# Patient Record
Sex: FEMALE
[De-identification: names, ages, dates, MRNs, and addresses within clinical notes are randomized per-mention and may not be internally consistent; named-entity substitution may affect disease eponyms.]

---

## 2018-09-20 NOTE — DIAGNOSTIC IMAGING REPORT
PROCEDURE:X-RAY ABDOMEN - KUB

 

COMPARISON:None.

 

INDICATIONS:CONSTIPATION

 

FINDINGS:

 

The left hemidiaphragm is elevated. There are no dilated loops of bowel 

to suggest obstruction. Moderate distal colonic and rectal feces is 

present. There are no masses or abnormal calcifications.  Clips in the 

right upper quadrant of the abdomen. There is no evidence of free air. 

No acute osseous abnormalities are present.

 

 

CONCLUSION:

1. No acute abdominal abnormality. 

2. Moderate amount of rectal and distal colonic fecal material.

 

 

 

 

Jeremie Aguilera D.O.  

Dictated by:  Jeremie Aguilera D.O. on 9/20/2018 at 10:55     

Electronically approved by:  Jeremie Aguilera D.O. on 9/20/2018 at 10:55

## 2018-11-14 NOTE — DIAGNOSTIC IMAGING REPORT
PROCEDURE:  X-RAY UPPER GI SERIES WITH AIR CONTRAST

 

TECHNIQUE: Effervescent crystals and barium were ingested by mouth and 

multiple fluoroscopic images of the esophagus, stomach, and proximal 

small bowel were obtained.

 

Fluoroscopy time: 3 minutes

Air Kerma:  43.2 mGy

   

 

COMPARISON: Abdominal radiograph 9/20/2018.

 

INDICATIONS: Belching, heartburn

 

FINDINGS: 

 

ESOPHAGUS:  

  Motility: Multiple tertiary, nonperistaltic contractions.

  Mucosa: The esophageal mucosa is unremarkable. However, there is mass 

effect on the right postero-lateral margin of the distal esophagus.

  Distensibility: Normal.

 

GASTROESOPHAGEAL JUNCTION: No hiatal hernia is seen. However, there is 

left hemidiaphragmatic elevation with the majority of the gastric body 

within the left upper quadrant.

 

GASTROESOPHAGEAL REFLUX: Mild reflux into the lower third of the 

esophagus.

 

STOMACH: Normally distensible with normal contours. Mild thickening of 

the mucosal folds suggestive of gastritis.

 

DUODENUM:  Bulb and sweep are normal.  Duodenal-jejunal junction is in 

the normal expected position.

 

 

 

IMPRESSION: 

 

Mild gastroesophageal reflux.

 

Mild thickening of the gastric mucosal folds suggestive of gastritis.

 

Indentation/mass effect on the distal aspect of the esophagus without 

overlying mucosal irregularity. Differential diagnosis includes a 

submucosal esophageal lesion or mass effect from an adjacent middle or 

posterior mediastinal mass. CT scan of the chest with contrast is 

suggested for further evaluation.

   

 

Dictated by:  Erasmo Mcgarry M.D. on 11/14/2018 at 8:38     

Electronically approved by:  Erasmo Mcgarry M.D. on 11/14/2018 at 8:38

## 2018-12-08 NOTE — XMS REPORT
Patient Summary Document

                             Created on: 2018



RUBEN YEUNG

External Reference #: [id number]

: 1940

Sex: Female



Demographics







                          Address                   [address] [place] [address]

 

                          Home Phone                [phone]

 

                          Preferred Language        Unknown

 

                          Marital Status            Unknown

 

                          Evangelical Affiliation     Unknown

 

                          Race                      Unknown

 

                                        Additional Race(s)  

 

                          Ethnic Group              Unknown





Author







                          Author                    [facility]

 

                          [facility]

 

                          Address                   Unknown

 

                          Phone                     Unavailable







Care Team Providers







                    Care Team Member Name    Role                Phone

 

                    SVEN EDWARDS      Unavailable         Unavailable

 

                    SVEN THOMPSON    Unavailable         Unavailable







Problems

This patient has no known problems.



Allergies, Adverse Reactions, Alerts

This patient has no known allergies or adverse reactions.



Medications

This patient has no known medications.



Results







           Test Description    Test Time    Test Comments    Text Results    Atomic Results    Result

 Comments

 

                UPPER GI W/AIR CONTR    2018 08:38:00                                                      

                                                    [place] [address]      Patient Name: RUBEN YEUNG                               
   MR #: [id number]                     : 1940                         
         Age/Sex: 78/F  Acct #: [id number]                              Req #:
[id number]  Adm Physician:                                                      
Ordered by: SUJATA EDWARDS MD                            Report #: [id number]    
   Location: DX                                      Room/Bed:                  
  
___________________________________________________________________________________________________
   Procedure: [id number] DX/UPPER GI W/AIR CONTR  Exam Date:                     
       Exam Time:                                               REPORT STATUS: 
Signed    PROCEDURE:  X-RAY UPPER GI SERIES WITH AIR CONTRAST       TECHNIQUE: 
Effervescent crystals and barium were ingested by mouth and    multiple 
fluoroscopic images of the esophagus, stomach, and proximal    small bowel were 
obtained.       Fluoroscopy time: 3 minutes   Air Kerma:  43.2 mGy             
COMPARISON: Abdominal radiograph 2018.       INDICATIONS: Belching, 
heartburn       FINDINGS:        ESOPHAGUS:       Motility: Multiple tertiary, 
nonperistaltic contractions.     Mucosa: The esophageal mucosa is unremarkable. 
However, there is mass    effect on the right postero-lateral margin of the 
distal esophagus.     Distensibility: Normal.       GASTROESOPHAGEAL JUNCTION: 
No hiatal hernia is seen. However, there is    left hemidiaphragmatic elevation 
with the majority of the gastric body    within the left upper quadrant.       
GASTROESOPHAGEAL REFLUX: Mild reflux into the lower third of the    esophagus.  
    STOMACH: Normally distensible with normal contours. Mild thickening of    
the mucosal folds suggestive of gastritis.       DUODENUM:  Bulb and sweep are 
normal.  Duodenal-jejunal junction is in    the normal expected position.       
       IMPRESSION:        Mild gastroesophageal reflux.       Mild thickening of
the gastric mucosal folds suggestive of gastritis.       Indentation/mass effect
on the distal aspect of the esophagus without    overlying mucosal irregularity.
Differential diagnosis includes a    submucosal esophageal lesion or mass effect
from an adjacent middle or    posterior mediastinal mass. CT scan of the chest 
with contrast is    suggested for further evaluation.             Dictated by:  
Mary Hsu M.D. on 2018 at 8:38        Electronically approved by:  Mary Hsu M.D. on 2018 at 8:38                Dictated By: MARY HSU MD  
Electronically Signed By: MARY HSU MD on 18  Transcribed By: 
NAYLA on 18       COPY TO:   SUJATA EDWARDS MD             

 

                ABDOMEN-VIEW ([facility])    2018 10:55:00                        [place] [address]      Patient Name: 
RUBEN YEUNG   MR #: [id number]    : 1940 Age/Sex: 78/F  Acct #: 
[id number] Req #: [id number]  Adm Physician:     Ordered by: JIA THOMPSON MD  Report #: [id number]   Location: ER  Room/Bed:     
_________________________________________________________________________
__________________________    Procedure: [id number] DX/ABDOMEN-1VIEW (KUB)  Exam 
Date: 18                            Exam Time: 0935       REPORT STATUS: 
Signed    PROCEDURE:   X-RAY ABDOMEN - KUB       COMPARISON:   None.       
INDICATIONS:   CONSTIPATION       FINDINGS:          The left hemidiaphragm is 
elevated. There are no dilated loops of bowel    to suggest obstruction. 
Moderate distal colonic and rectal feces is    present. There are no masses or 
abnormal calcifications.  Clips in the    right upper quadrant of the abdomen. 
There is no evidence of free air.    No acute osseous abnormalities are present.
          CONCLUSION:      1. No acute abdominal abnormality.    2. Moderate 
amount of rectal and distal colonic fecal material.                   Blaze Aguilera D.O.     Dictated by:  Blaze Aguilera D.O. on 2018 at 10:55       
Electronically approved by:  Blaze Aguilera D.O. on 2018 at 10:55          
        Dictated By: BLAZE AGUILERA DO  Electronically Signed By: BLAZE AGUILERA DO on 18 1055  Transcribed By: NAYLA on 18 1055       COPY TO:   
JIA THOMPSON MD

## 2019-01-02 NOTE — DIAGNOSTIC IMAGING REPORT
******** ADDENDUM #1 ********



ADDENDUM:

Dose modulation, iterative reconstruction, and/or weight based adjustment of

the mA/kV was utilized to reduce the radiation dose to as low as reasonably

achievable. 



Signed by: Dr. Sanya Amin MD on 1/2/2019 5:24 PM

******** ORIGINAL REPORT ********



EXAM: CT Abdomen and Pelvis WITH contrast  



COMPARISON: None.

TECHNIQUE: Abdomen and pelvis were scanned utilizing a multidetector helical

scanner from the lung base to the pubic symphysis after administration of IV

contrast. Coronal and sagittal reformations were obtained. Routine protocol was

performed. Scan was performed when during portal venous phase.    

            

RADIATION DOSE:

     Total DLP:  361.7 mGy*cm

     CTDIvol has been reviewed. It is below the limits set by the Radiation

Protocol Committee (RPC).



FINDINGS:

LINES and TUBES: None.



LOWER THORAX:  Please refer to the concurrently performed chest CT for details

of intrathoracic findings.



HEPATOBILIARY: No evidence of focal lesion. No biliary ductal dilation. Status

post cholecystectomy. There is a 2.8 cm simple cyst in the caudate lobe.



SPLEEN: No splenomegaly. 



PANCREAS: No focal masses or ductal dilatation.  



ADRENALS: No adrenal nodules 



KIDNEYS/URETERS: Kidneys enhance symmetrically. No evidence of hydronephrosis,

solid mass, or stone. 



GI TRACT: The left hemidiaphragm is elevated, and consequently the body of the

stomach is in the left lower hemithorax with the fundus of the stomach folding

inferiorly. However there is no gastroesophageal hernia or evidence of

volvulus. No evidence of wall thickening or distension. Appendix is not clearly

identified. There is however no fat stranding or adenopathy in the right lower

quadrant to suggest appendicitis. There is extensive descending and sigmoid

colonic diverticulosis without CT evidence of diverticulitis. There is retained

contrast in some of the diverticula.



PELVIC ORGANS/BLADDER: Unremarkable.



LYMPH NODES: No lymphadenopathy.



VESSELS: Moderate atherosclerotic calcifications of the abdominal aorta and

branch vessels. 



PERITONEUM / RETROPERITONEUM: No free air or fluid.



BONES AND SOFT TISSUES: No acute bony findings. Degenerative changes of the

visualized spine.



CONCLUSION:

No evidence of acute pathology in the abdomen or pelvis.



Diverticulosis without CT evidence of diverticulitis. 



No evidence of hiatal hernia. The left hemidiaphragm is elevated, and

consequently the body of the stomach is in the left lower hemithorax with the

fundus of the stomach folding inferiorly. No evidence of volvulus. 



Signed by: Dr. Sanya Amin MD on 1/2/2019 4:37 PM

## 2019-01-02 NOTE — DIAGNOSTIC IMAGING REPORT
EXAM: CT Chest WITH contrast 



COMPARISON: None



TECHNIQUE:

Chest was scanned utilizing a multidetector helical scanner from the lung apex

through the level of the adrenal glands without administration of IV contrast.

Coronal and sagittal reformations were obtained. Routine protocol was

performed.



           RADIATION DOSE: Total DLP: 361.7 mGy*cm

           COMPLICATIONS: None



FINDINGS:

LINES/ TUBES: None.



LUNGS AND AIRWAYS: The central airways are patent. No evidence of consolidation

or pulmonary edema. 



PLEURA: The pleural spaces are clear.



HEART AND MEDIASTINUM: The thyroid gland is normal.  No mediastinal, hilar or

axillary lymphadenopathy. No cardiomegaly or pericardial effusion. Extensive

coronary atherosclerosis. Moderate atherosclerotic calcifications of the

thoracic aorta and branch vessels. Mild elevation of left hemidiaphragm. Mild

enlargement of the main pulmonary artery, measuring up to 3.1 cm.



UPPER ABDOMEN: Please refer to the concurrently performed abdominal CT for

further details. 



BONES/SOFT TISSUES: No acute bony findings. Partially seen cervical spine

fixation hardware.



IMPRESSION: 

No acute intrathoracic findings.



Extensive coronary atherosclerosis.



Mild enlargement of the main pulmonary artery, measuring up to 3.1 cm, which

may reflect pulmonary arterial hypertension.



Please refer to the concurrently performed abdominal CT for further details. 



Signed by: Dr. Sanya Amin MD on 1/2/2019 4:19 PM

## 2019-06-03 LAB
ANION GAP SERPL CALC-SCNC: 11.6 MMOL/L (ref 8–16)
BASOPHILS # BLD AUTO: 0 10*3/UL (ref 0–0.1)
BASOPHILS NFR BLD AUTO: 0.2 % (ref 0–1)
BUN SERPL-MCNC: 18 MG/DL (ref 7–26)
BUN/CREAT SERPL: 24 (ref 6–25)
CALCIUM SERPL-MCNC: 10.5 MG/DL (ref 8.4–10.2)
CHLORIDE SERPL-SCNC: 100 MMOL/L (ref 98–107)
CO2 SERPL-SCNC: 30 MMOL/L (ref 22–29)
DEPRECATED NEUTROPHILS # BLD AUTO: 5.1 10*3/UL (ref 2.1–6.9)
EGFRCR SERPLBLD CKD-EPI 2021: > 60 ML/MIN (ref 60–?)
EOSINOPHIL # BLD AUTO: 0 10*3/UL (ref 0–0.4)
EOSINOPHIL NFR BLD AUTO: 0.5 % (ref 0–6)
ERYTHROCYTE [DISTWIDTH] IN CORD BLOOD: 13.2 % (ref 11.7–14.4)
GLUCOSE SERPLBLD-MCNC: 102 MG/DL (ref 74–118)
HCT VFR BLD AUTO: 45.6 % (ref 34.2–44.1)
HGB BLD-MCNC: 15.1 G/DL (ref 12–16)
LYMPHOCYTES # BLD: 2.1 10*3/UL (ref 1–3.2)
LYMPHOCYTES NFR BLD AUTO: 25.8 % (ref 18–39.1)
MCH RBC QN AUTO: 30.6 PG (ref 28–32)
MCHC RBC AUTO-ENTMCNC: 33.1 G/DL (ref 31–35)
MCV RBC AUTO: 92.5 FL (ref 81–99)
MONOCYTES # BLD AUTO: 0.9 10*3/UL (ref 0.2–0.8)
MONOCYTES NFR BLD AUTO: 11.1 % (ref 4.4–11.3)
NEUTS SEG NFR BLD AUTO: 62.2 % (ref 38.7–80)
PLATELET # BLD AUTO: 219 X10E3/UL (ref 140–360)
POTASSIUM SERPL-SCNC: 3.6 MMOL/L (ref 3.5–5.1)
RBC # BLD AUTO: 4.93 X10E6/UL (ref 3.6–5.1)
SODIUM SERPL-SCNC: 138 MMOL/L (ref 136–145)

## 2019-06-03 NOTE — DIAGNOSTIC IMAGING REPORT
EXAMINATION:  CHEST 2 VIEWS    



INDICATION: Pre-admit.



COMPARISON:  CT chest with contrast 1/2/2019.

     

FINDINGS:

TUBES and LINES:  None.



LUNGS:  Lungs are well inflated.  There is no evidence of pneumonia or

pulmonary edema.



PLEURA:  No pleural effusion or pneumothorax. Mild elevation of left

hemidiaphragm, unchanged.



HEART AND MEDIASTINUM:  The cardiomediastinal silhouette is unremarkable. There

are atherosclerotic calcifications within the aorta.



BONES AND SOFT TISSUES:  No acute osseous abnormality.



UPPER ABDOMEN: No free air under the diaphragm.    



IMPRESSION: 

No acute radiographic abnormality.



Signed by: Dr. Sanya Amin MD on 6/3/2019 11:31 AM

## 2019-06-06 ENCOUNTER — HOSPITAL ENCOUNTER (OUTPATIENT)
Dept: HOSPITAL 88 - OR | Age: 79
Discharge: HOME | End: 2019-06-06
Attending: SURGERY
Payer: MEDICARE

## 2019-06-06 VITALS — SYSTOLIC BLOOD PRESSURE: 148 MMHG | DIASTOLIC BLOOD PRESSURE: 74 MMHG

## 2019-06-06 DIAGNOSIS — Z01.812: ICD-10-CM

## 2019-06-06 DIAGNOSIS — Z01.810: ICD-10-CM

## 2019-06-06 DIAGNOSIS — C50.911: Primary | ICD-10-CM

## 2019-06-06 DIAGNOSIS — I10: ICD-10-CM

## 2019-06-06 DIAGNOSIS — K21.9: ICD-10-CM

## 2019-06-06 DIAGNOSIS — Z01.811: ICD-10-CM

## 2019-06-06 PROCEDURE — 71046 X-RAY EXAM CHEST 2 VIEWS: CPT

## 2019-06-06 PROCEDURE — 93005 ELECTROCARDIOGRAM TRACING: CPT

## 2019-06-06 PROCEDURE — 19301 PARTIAL MASTECTOMY: CPT

## 2019-06-06 PROCEDURE — 36415 COLL VENOUS BLD VENIPUNCTURE: CPT

## 2019-06-06 PROCEDURE — 80048 BASIC METABOLIC PNL TOTAL CA: CPT

## 2019-06-06 PROCEDURE — 88307 TISSUE EXAM BY PATHOLOGIST: CPT

## 2019-06-06 PROCEDURE — 85025 COMPLETE CBC W/AUTO DIFF WBC: CPT

## 2019-06-06 NOTE — OPERATIVE REPORT
DATE OF PROCEDURE:  06/06/2019

 

SURGEON:  Bobby De Leon MD

 

PREOPERATIVE DIAGNOSIS:  Carcinoma of the right breast.

 

POSTOPERATIVE DIAGNOSIS:  Carcinoma of the right breast.

 

OPERATION PERFORMED:  Lumpectomy of the right breast.

 

ASSISTANT:  DONOVAN Aiken.

 

ANESTHESIA:  General.

 

COMPLICATIONS:  None.

 

ESTIMATED BLOOD LOSS:  Minimal.

 

PROCEDURE IN DETAIL:  With the patient lying in bed in the supine position under good

general anesthesia, the right breast and chest were prepped with Betadine solution and

draped in the usual manner.  The area overlying the mass in the 9 o'clock position of

the right breast was then infiltrated with 0.25% Marcaine with epinephrine.  An incision

was made from about 10 o'clock to about the 8 o'clock position and the mass was then

slowly and carefully resected with normal breast tissue all the way around, it was

totally and completely removed.  It was then properly oriented with sutures for

pathology orientation.  The whole area was then thoroughly irrigated, perfect hemostasis

was ascertained and the wound was then closed in layers.  The breast tissue was

reapproximated with interrupted sutures of 2-0 chromic and the skin was closed with

interrupted vertical mattress sutures of 3-0 nylon.  A dressing was applied.  The

sponge, lap, and needle count was correct.  The patient tolerated the procedure well and

returned to the recovery room in stable condition. 

 

 

 

 

______________________________

MD SERA LrR/MODL

D:  06/06/2019 10:54:49

T:  06/06/2019 17:55:50

Job #:  [id number]

## 2019-08-19 LAB
ANION GAP SERPL CALC-SCNC: 12.9 MMOL/L (ref 8–16)
BASOPHILS # BLD AUTO: 0 10*3/UL (ref 0–0.1)
BASOPHILS NFR BLD AUTO: 0.3 % (ref 0–1)
BUN SERPL-MCNC: 17 MG/DL (ref 7–26)
BUN/CREAT SERPL: 25 (ref 6–25)
CALCIUM SERPL-MCNC: 10.2 MG/DL (ref 8.4–10.2)
CHLORIDE SERPL-SCNC: 100 MMOL/L (ref 98–107)
CO2 SERPL-SCNC: 31 MMOL/L (ref 22–29)
DEPRECATED NEUTROPHILS # BLD AUTO: 3.1 10*3/UL (ref 2.1–6.9)
EGFRCR SERPLBLD CKD-EPI 2021: > 60 ML/MIN (ref 60–?)
EOSINOPHIL # BLD AUTO: 0.1 10*3/UL (ref 0–0.4)
EOSINOPHIL NFR BLD AUTO: 1.2 % (ref 0–6)
ERYTHROCYTE [DISTWIDTH] IN CORD BLOOD: 13.5 % (ref 11.7–14.4)
GLUCOSE SERPLBLD-MCNC: 95 MG/DL (ref 74–118)
HCT VFR BLD AUTO: 44 % (ref 34.2–44.1)
HGB BLD-MCNC: 14 G/DL (ref 12–16)
LYMPHOCYTES # BLD: 1.9 10*3/UL (ref 1–3.2)
LYMPHOCYTES NFR BLD AUTO: 33.6 % (ref 18–39.1)
MCH RBC QN AUTO: 29.7 PG (ref 28–32)
MCHC RBC AUTO-ENTMCNC: 31.8 G/DL (ref 31–35)
MCV RBC AUTO: 93.4 FL (ref 81–99)
MONOCYTES # BLD AUTO: 0.7 10*3/UL (ref 0.2–0.8)
MONOCYTES NFR BLD AUTO: 11.8 % (ref 4.4–11.3)
NEUTS SEG NFR BLD AUTO: 52.8 % (ref 38.7–80)
PLATELET # BLD AUTO: 201 X10E3/UL (ref 140–360)
POTASSIUM SERPL-SCNC: 3.9 MMOL/L (ref 3.5–5.1)
RBC # BLD AUTO: 4.71 X10E6/UL (ref 3.6–5.1)
SODIUM SERPL-SCNC: 140 MMOL/L (ref 136–145)

## 2019-08-19 NOTE — DIAGNOSTIC IMAGING REPORT
Chest, 2 views,  8/19/2019.   

 



History: Preop, mastectomy.



Comparison: 8/3/2019.



Findings: The cardiomediastinal silhouette and pulmonary vasculature are within

normal limits. The lungs are clear without evidence of consolidation or pleural

effusion. There is persistent elevation of the left hemidiaphragm. Surgical

hardware is partially seen within the lower cervical spine. There are no acute

osseous or soft tissue abnormalities. 



Impression: 

No acute cardiopulmonary abnormality.



Signed by: Minor Nieto on 8/19/2019 10:50 AM

## 2019-08-26 ENCOUNTER — HOSPITAL ENCOUNTER (OUTPATIENT)
Dept: HOSPITAL 88 - OR | Age: 79
Setting detail: OBSERVATION
LOS: 1 days | Discharge: HOME | End: 2019-08-27
Attending: SURGERY | Admitting: SURGERY
Payer: MEDICARE

## 2019-08-26 VITALS — DIASTOLIC BLOOD PRESSURE: 67 MMHG | SYSTOLIC BLOOD PRESSURE: 136 MMHG

## 2019-08-26 VITALS — HEIGHT: 62 IN | WEIGHT: 123 LBS | BODY MASS INDEX: 22.63 KG/M2

## 2019-08-26 VITALS — DIASTOLIC BLOOD PRESSURE: 81 MMHG | SYSTOLIC BLOOD PRESSURE: 169 MMHG

## 2019-08-26 DIAGNOSIS — Z01.812: ICD-10-CM

## 2019-08-26 DIAGNOSIS — Z86.19: ICD-10-CM

## 2019-08-26 DIAGNOSIS — K21.9: ICD-10-CM

## 2019-08-26 DIAGNOSIS — I10: ICD-10-CM

## 2019-08-26 DIAGNOSIS — Z01.811: ICD-10-CM

## 2019-08-26 DIAGNOSIS — C50.919: Primary | ICD-10-CM

## 2019-08-26 DIAGNOSIS — Z01.810: ICD-10-CM

## 2019-08-26 PROCEDURE — 36415 COLL VENOUS BLD VENIPUNCTURE: CPT

## 2019-08-26 PROCEDURE — 19303 MAST SIMPLE COMPLETE: CPT

## 2019-08-26 PROCEDURE — 88307 TISSUE EXAM BY PATHOLOGIST: CPT

## 2019-08-26 PROCEDURE — 96361 HYDRATE IV INFUSION ADD-ON: CPT

## 2019-08-26 PROCEDURE — 88342 IMHCHEM/IMCYTCHM 1ST ANTB: CPT

## 2019-08-26 PROCEDURE — 78195 LYMPH SYSTEM IMAGING: CPT

## 2019-08-26 PROCEDURE — 96360 HYDRATION IV INFUSION INIT: CPT

## 2019-08-26 PROCEDURE — 38900 IO MAP OF SENT LYMPH NODE: CPT

## 2019-08-26 PROCEDURE — 71046 X-RAY EXAM CHEST 2 VIEWS: CPT

## 2019-08-26 PROCEDURE — 88305 TISSUE EXAM BY PATHOLOGIST: CPT

## 2019-08-26 PROCEDURE — 38525 BIOPSY/REMOVAL LYMPH NODES: CPT

## 2019-08-26 PROCEDURE — 93005 ELECTROCARDIOGRAM TRACING: CPT

## 2019-08-26 PROCEDURE — 85025 COMPLETE CBC W/AUTO DIFF WBC: CPT

## 2019-08-26 PROCEDURE — 80048 BASIC METABOLIC PNL TOTAL CA: CPT

## 2019-08-26 RX ADMIN — SODIUM CHLORIDE SCH MLS/HR: 9 INJECTION, SOLUTION INTRAVENOUS at 13:56

## 2019-08-26 RX ADMIN — CEFAZOLIN SODIUM SCH MLS/HR: 1 SOLUTION INTRAVENOUS at 20:37

## 2019-08-26 NOTE — NUR
RECEIVED PATIENT IN BEDSIDE REPORT. PATIENT AMBULATED TO RESTROOM WITH ASSISTANCE AND 
VOIDED. STEADY GAIT NOTED. CHEST BINDING AND DRESSING C/D/I. ELENA DRAINS EMPTIED AT THIS TIME, 
DOCUMENTED BY DAY SHIFT. L FA 20G IV ASYMPTOMATIC, PATENT, AND INTACT. MINIMAL PAIN 
REPORTED. NO S&S OF DISTRESS NOTED. BED LOCKED IN LOWEST POSITION, SIDE RAILS UPX2, CALL 
LIGHT IN REACH. FAMILY MEMBER AT BEDSIDE.

## 2019-08-26 NOTE — DIAGNOSTIC IMAGING REPORT
Lymphoscintigraphy



Reason for Exam: Right breast cancer; scheduled for sentinel lymph node biopsy



Radiopharmaceutical: Tc-99m filtered sulfur colloid 579 microcuries



Report: The radiotracer was given as two separate injections intradermally at

the edge of the right areola.  



A single focal area of increased tracer accumulation is seen in the right

axilla.   Two very faint foci of tracer accumulation are seen beyond the single

focal area of tracer accumulation in the right axilla and represent secondary

lymph nodes.  No accumulation of tracer is seen in the midline of the chest or

in the neck.



Impression: 



A single sentinel lymph node is identified in the right axilla.



Signed by: Dr. Amy White M.D. on 8/26/2019 5:09 PM

## 2019-08-26 NOTE — NUR
received pt to room, pt stable, alert, oriented X3, no distress noted, dressing to chest 
c/d/i, ELENA drains x2, denies pain, SCD's applied, updated on plan of care, voiced 
understanding, call light in reach, will continue to monitor.

## 2019-08-26 NOTE — OPERATIVE REPORT
DATE OF PROCEDURE:  08/26/2019

 

SURGEON:  Bobby De Leon MD

 

PREOPERATIVE DIAGNOSES:  Right breast cancer and left breast mass.

 

POSTOPERATIVE DIAGNOSES:  Right breast cancer and left breast mass.

 

OPERATIONS PERFORMED:  Bilateral total mastectomies with right axillary sentinel node

biopsy with preoperative sentinel node mapping. 

 

ANESTHESIA:  General.

 

COMPLICATIONS:  None.

 

ESTIMATED BLOOD LOSS:  75 mL.

 

DESCRIPTION OF PROCEDURE:  With the patient lying in bed in the supine position under

good general endotracheal anesthesia.  After having undergone a right sentinel node

mapping preoperatively, both breasts and axilla were prepped with Betadine solution and

draped in the usual manner, the left side was done first.  A transverse was incision was

made to include nipple areolar complex as well as the lateral incision that the patient

had in the left breast.  Incision was deepened through the subcutaneous tissue and flaps

were then developed in all directions.  The limits of the flaps were medially the

sternum, superiorly the clavicle, and laterally the latissimus dorsi.  The breast tissue

was then taken off the pectoralis major muscle, and hemostasis was ascertained and the

entire breast was swung laterally.  The breast tissue was then  from the

latissimus dorsi laterally as well as the pectoralis major, and the breast was totally

and completely removed and sent for pathological examination.  The whole area was

thoroughly irrigated.  Perfect hemostasis was ascertained.  A 10 flat Jose Maria-Chamorro

drain was left in the wound and then the wound was closed with interrupted vertical

mattress sutures of 2-0 and 3-0 silk.  Attention was then placed to the left breast;

similarly a transverse incision was made to include the nipple areola complex as well as

the old incision from the lumpectomy, incision was then deepened through the

subcutaneous tissue and flaps were developed in all directions.  The margins were

medially the sternum, superiorly the clavicle, inferiorly the rectus muscle, and

laterally the latissimus dorsi, this breast was then swung off the pectoralis major

muscle and pectoralis major muscle was then  laterally, and the top of the

axilla was exposed.  The breast was then taken off the lateral attachments and sent for

pathological examination.  Using the Neoprobe, the sentinel node was easily identified

with the preoperative mapping and the area was then exposed and the lymph node packet

around the sentinel node was then removed.  Everything was tied with 2-0 Vicryl suture.

The counts on the sentinel node were upwards of 1000, there was no other activity in the

axilla once the sentinel node and surrounding tissue was removed.  The whole area was

thoroughly irrigated.  Perfect hemostasis was ascertained.  A 10 flat Jose Maria-Chamorro

drain was then brought out through separate stab wound incision and the wound was then

closed with interrupted vertical mattress sutures of 2-0 and 3-0 silk, dressings 

were applied.  The sponge, lap, and needle count were correct.  The patient tolerated

the procedure well and returned to the recovery room in stable condition. 

 

 

 

______________________________

MD CÉSAR Lr/EV

D:  08/26/2019 14:07:02

T:  08/26/2019 15:48:31

Job #:  [id number]

## 2019-08-26 NOTE — NUR
report given to oncoming night shift RN. family at bedside, pt ambulated to restroom without 
difficulty. call light within reach, pt in stable condition.

## 2019-08-27 VITALS — SYSTOLIC BLOOD PRESSURE: 136 MMHG | DIASTOLIC BLOOD PRESSURE: 60 MMHG

## 2019-08-27 VITALS — DIASTOLIC BLOOD PRESSURE: 54 MMHG | SYSTOLIC BLOOD PRESSURE: 115 MMHG

## 2019-08-27 VITALS — SYSTOLIC BLOOD PRESSURE: 115 MMHG | DIASTOLIC BLOOD PRESSURE: 54 MMHG

## 2019-08-27 VITALS — SYSTOLIC BLOOD PRESSURE: 160 MMHG | DIASTOLIC BLOOD PRESSURE: 65 MMHG

## 2019-08-27 VITALS — SYSTOLIC BLOOD PRESSURE: 107 MMHG | DIASTOLIC BLOOD PRESSURE: 60 MMHG

## 2019-08-27 VITALS — SYSTOLIC BLOOD PRESSURE: 122 MMHG | DIASTOLIC BLOOD PRESSURE: 58 MMHG

## 2019-08-27 LAB
ANION GAP SERPL CALC-SCNC: 11.2 MMOL/L (ref 8–16)
BASOPHILS # BLD AUTO: 0 10*3/UL (ref 0–0.1)
BASOPHILS NFR BLD AUTO: 0.1 % (ref 0–1)
BUN SERPL-MCNC: 14 MG/DL (ref 7–26)
BUN/CREAT SERPL: 20 (ref 6–25)
CALCIUM SERPL-MCNC: 9.4 MG/DL (ref 8.4–10.2)
CHLORIDE SERPL-SCNC: 102 MMOL/L (ref 98–107)
CO2 SERPL-SCNC: 27 MMOL/L (ref 22–29)
DEPRECATED NEUTROPHILS # BLD AUTO: 6.2 10*3/UL (ref 2.1–6.9)
EGFRCR SERPLBLD CKD-EPI 2021: > 60 ML/MIN (ref 60–?)
EOSINOPHIL # BLD AUTO: 0.1 10*3/UL (ref 0–0.4)
EOSINOPHIL NFR BLD AUTO: 1.6 % (ref 0–6)
EOSINOPHIL NFR BLD MANUAL: 1 % (ref 0–7)
ERYTHROCYTE [DISTWIDTH] IN CORD BLOOD: 13.7 % (ref 11.7–14.4)
GLUCOSE SERPLBLD-MCNC: 119 MG/DL (ref 74–118)
HCT VFR BLD AUTO: 37.9 % (ref 34.2–44.1)
HGB BLD-MCNC: 12.4 G/DL (ref 12–16)
LYMPHOCYTES # BLD: 1.3 10*3/UL (ref 1–3.2)
LYMPHOCYTES NFR BLD AUTO: 15.1 % (ref 18–39.1)
LYMPHOCYTES NFR BLD MANUAL: 17 % (ref 19–48)
MCH RBC QN AUTO: 30.2 PG (ref 28–32)
MCHC RBC AUTO-ENTMCNC: 32.7 G/DL (ref 31–35)
MCV RBC AUTO: 92.2 FL (ref 81–99)
MONOCYTES # BLD AUTO: 1.1 10*3/UL (ref 0.2–0.8)
MONOCYTES NFR BLD AUTO: 12.3 % (ref 4.4–11.3)
MONOCYTES NFR BLD MANUAL: 13 % (ref 3.4–9)
NEUTS SEG NFR BLD AUTO: 70.7 % (ref 38.7–80)
NEUTS SEG NFR BLD MANUAL: 69 % (ref 40–74)
PLATELET # BLD AUTO: 172 X10E3/UL (ref 140–360)
PLATELET # BLD EST: (no result) 10*3/UL
POTASSIUM SERPL-SCNC: 4.2 MMOL/L (ref 3.5–5.1)
RBC # BLD AUTO: 4.11 X10E6/UL (ref 3.6–5.1)
RBC MORPH BLD: NORMAL
SODIUM SERPL-SCNC: 136 MMOL/L (ref 136–145)

## 2019-08-27 RX ADMIN — CEFAZOLIN SODIUM SCH MLS/HR: 1 SOLUTION INTRAVENOUS at 04:00

## 2019-08-27 RX ADMIN — SODIUM CHLORIDE SCH MLS/HR: 9 INJECTION, SOLUTION INTRAVENOUS at 08:52

## 2019-08-27 NOTE — NUR
Visit made by the Spiritual Care Department Pastoral Visitor, Ange Madison. PV provided 
pastoral presence, prayer, hospitality, and supportive listening. 

Pastoral Visitor informed pt/family of the scope of Chaplaincy Services and availability.



SHRUTHI VARGAS



Spiritual Care Department

O: [phone]

Pager: [phone] ([phone] calling [phone])

## 2019-08-27 NOTE — NUR
PATIENT REPORTS SHE IS FEELING NO PAIN AT THIS TIME. ELENA DRAINS EMPTIED, 20ML FROM R DRAIN, 
25ML FROM L DRAIN. NO NEEDS EXPRESSED. BED LOCKED IN LOWEST POSITION, SIDE RAILS UPX2, CALL 
LIGHT IN REACH.

## 2019-08-27 NOTE — NUR
shift change report received from night shift RN, pt resting in bed, daughter at bedside, pt 
in stable condition. will continue to monitor.

## 2019-11-05 LAB
ANION GAP SERPL CALC-SCNC: 10.6 MMOL/L (ref 8–16)
BASOPHILS # BLD AUTO: 0 10*3/UL (ref 0–0.1)
BASOPHILS NFR BLD AUTO: 0.4 % (ref 0–1)
BUN SERPL-MCNC: 16 MG/DL (ref 7–26)
BUN/CREAT SERPL: 24 (ref 6–25)
CALCIUM SERPL-MCNC: 10.1 MG/DL (ref 8.4–10.2)
CHLORIDE SERPL-SCNC: 99 MMOL/L (ref 98–107)
CO2 SERPL-SCNC: 31 MMOL/L (ref 22–29)
DEPRECATED NEUTROPHILS # BLD AUTO: 2.8 10*3/UL (ref 2.1–6.9)
EGFRCR SERPLBLD CKD-EPI 2021: > 60 ML/MIN (ref 60–?)
EOSINOPHIL # BLD AUTO: 0.1 10*3/UL (ref 0–0.4)
EOSINOPHIL NFR BLD AUTO: 1.3 % (ref 0–6)
ERYTHROCYTE [DISTWIDTH] IN CORD BLOOD: 13.5 % (ref 11.7–14.4)
GLUCOSE SERPLBLD-MCNC: 102 MG/DL (ref 74–118)
HCT VFR BLD AUTO: 44.2 % (ref 34.2–44.1)
HGB BLD-MCNC: 14.4 G/DL (ref 12–16)
LYMPHOCYTES # BLD: 2 10*3/UL (ref 1–3.2)
LYMPHOCYTES NFR BLD AUTO: 36.3 % (ref 18–39.1)
MCH RBC QN AUTO: 30.1 PG (ref 28–32)
MCHC RBC AUTO-ENTMCNC: 32.6 G/DL (ref 31–35)
MCV RBC AUTO: 92.5 FL (ref 81–99)
MONOCYTES # BLD AUTO: 0.6 10*3/UL (ref 0.2–0.8)
MONOCYTES NFR BLD AUTO: 11.4 % (ref 4.4–11.3)
NEUTS SEG NFR BLD AUTO: 50.2 % (ref 38.7–80)
PLATELET # BLD AUTO: 199 X10E3/UL (ref 140–360)
POTASSIUM SERPL-SCNC: 3.6 MMOL/L (ref 3.5–5.1)
RBC # BLD AUTO: 4.78 X10E6/UL (ref 3.6–5.1)
SODIUM SERPL-SCNC: 137 MMOL/L (ref 136–145)

## 2019-11-05 NOTE — DIAGNOSTIC IMAGING REPORT
EXAMINATION:  CHEST 2 VIEWS    



INDICATION: Pre-operative



COMPARISON: Chest radiograph of 8/19/2019

     

FINDINGS:



LINES/TUBES:None



LUNGS:The lungs are well-inflated. No focal consolidation or pulmonary edema.

Elevation of the left hemidiaphragm



PLEURA:No pleural effusion or pneumothorax.



MEDIASTINUM:The cardiomediastinal silhouette appears normal in size and shape.

Atherosclerotic calcifications of the thoracic aorta.



BONES/SOFT TISSUES:No acute osseous injury.



ABDOMEN:No free air under the diaphragm. Status post cholecystectomy.





IMPRESSION: 

No focal pneumonia or pulmonary edema. Unchanged elevation of the left

hemidiaphragm.







Signed by: Suzi Daniels MD on 11/5/2019 12:02 PM

## 2019-11-06 ENCOUNTER — HOSPITAL ENCOUNTER (OUTPATIENT)
Dept: HOSPITAL 88 - OR | Age: 79
Discharge: HOME | End: 2019-11-06
Attending: SURGERY
Payer: MEDICARE

## 2019-11-06 VITALS — SYSTOLIC BLOOD PRESSURE: 137 MMHG | DIASTOLIC BLOOD PRESSURE: 69 MMHG

## 2019-11-06 DIAGNOSIS — C50.919: Primary | ICD-10-CM

## 2019-11-06 DIAGNOSIS — Z01.812: ICD-10-CM

## 2019-11-06 DIAGNOSIS — Z88.8: ICD-10-CM

## 2019-11-06 DIAGNOSIS — I10: ICD-10-CM

## 2019-11-06 DIAGNOSIS — Z01.810: ICD-10-CM

## 2019-11-06 DIAGNOSIS — Z01.811: ICD-10-CM

## 2019-11-06 PROCEDURE — 36415 COLL VENOUS BLD VENIPUNCTURE: CPT

## 2019-11-06 PROCEDURE — 36561 INSERT TUNNELED CV CATH: CPT

## 2019-11-06 PROCEDURE — 93005 ELECTROCARDIOGRAM TRACING: CPT

## 2019-11-06 PROCEDURE — 80048 BASIC METABOLIC PNL TOTAL CA: CPT

## 2019-11-06 PROCEDURE — 76000 FLUOROSCOPY <1 HR PHYS/QHP: CPT

## 2019-11-06 PROCEDURE — 85025 COMPLETE CBC W/AUTO DIFF WBC: CPT

## 2019-11-06 PROCEDURE — 71046 X-RAY EXAM CHEST 2 VIEWS: CPT

## 2019-11-06 NOTE — OPERATIVE REPORT
DATE OF PROCEDURE:  11/06/2019

 

SURGEON:  Bobby De Leon MD

 

PREOPERATIVE DIAGNOSIS:  Breast cancer, needing IV access for chemotherapy.

 

POSTOPERATIVE DIAGNOSIS:  Breast cancer, needing IV access for chemotherapy.

 

OPERATION PERFORMED:  Placement of right internal jugular venous access port under C-arm

guidance. 

 

ASSISTANT:  DONOVAN Villegas.

 

ANESTHESIA:  General.

 

COMPLICATIONS:  None.

 

ESTIMATED BLOOD LOSS:  Minimal.

 

DESCRIPTION OF PROCEDURE:  With the patient lying in bed in the supine position under

good general endotracheal anesthesia, both chest and neck were prepped with Betadine

solution and draped in the usual manner.  The patient was placed in the Trendelenburg

position and a standard left subclavian venipuncture was performed without any

difficulty.  A guidewire was then placed through the needle, but the guidewire could not

be passed beyond this sternoclavicular joint on the left side and it just kept bouncing

up into the left internal jugular vein.  After we tried this several times, we then went

to the left internal jugular vein, which was again cannulated without any difficulty,

but again we had difficulty passing it into the superior vena cava.  There was some kind

of obstruction at the level of the junction of the left IJ and left subclavian vein.  At

this point, we tried the right subclavian vein and again, we got in without any

difficulty, but again had trouble threading the catheter through into the superior vena

cava.  We then tried the right internal jugular and we were able to get in without any

difficulty and a guidewire was then advanced into central venous position at the level

of the superior vena cava without any difficulty.  The tip of the wire was confirmed to

be at the level of the superior vena cava with the C-arm and both lungs were fully

expanded.  We then created a pocket in the left anterior chest to accept the reservoir.

The catheter was then threaded to the right internal jugular position without any

problems.  The reservoir was then anchored to the anterior chest wall with interrupted

sutures of 2-0 silk and the reservoir and catheter were fully heparinized.  The catheter

was cut to the appropriate length.  The peel-away sheath was then placed over the

guidewire without any difficulty and the catheter was then threaded through the

peel-away sheath without any problems and the peel-away sheath was removed.  There was

good blood return and the reservoir and catheter were fully heparinized.  Using the

C-arm, the tip of the catheter was confirmed to be at the level of the superior vena

cava and both lungs were fully expanded.  The wounds were then closed in layers.  The

subcutaneous tissue was approximated with 3-0 and 4-0 Vicryl and the skin was closed

with subcuticular 5-0 Vicryl.  Benzoin and Steri-Strips were 

placed.  A dressing was applied.  The sponge, lap, and needle count was correct.  The

patient tolerated the procedure well and returned to the recovery room in stable

condition. 

 

 

 

______________________________

MD CÉSAR Lr/EV

D:  11/06/2019 14:03:09

T:  11/06/2019 17:47:16

Job #:  [id number]

## 2020-02-03 ENCOUNTER — HOSPITAL ENCOUNTER (INPATIENT)
Dept: HOSPITAL 88 - ER | Age: 80
LOS: 5 days | Discharge: HOME | DRG: 809 | End: 2020-02-08
Attending: INTERNAL MEDICINE | Admitting: INTERNAL MEDICINE
Payer: MEDICARE

## 2020-02-03 VITALS — SYSTOLIC BLOOD PRESSURE: 100 MMHG | DIASTOLIC BLOOD PRESSURE: 74 MMHG

## 2020-02-03 VITALS — SYSTOLIC BLOOD PRESSURE: 111 MMHG | DIASTOLIC BLOOD PRESSURE: 53 MMHG

## 2020-02-03 VITALS — WEIGHT: 123.06 LBS | HEIGHT: 62 IN | BODY MASS INDEX: 22.65 KG/M2

## 2020-02-03 VITALS — DIASTOLIC BLOOD PRESSURE: 59 MMHG | SYSTOLIC BLOOD PRESSURE: 100 MMHG

## 2020-02-03 DIAGNOSIS — B37.49: ICD-10-CM

## 2020-02-03 DIAGNOSIS — T45.1X5A: ICD-10-CM

## 2020-02-03 DIAGNOSIS — R50.81: ICD-10-CM

## 2020-02-03 DIAGNOSIS — I10: ICD-10-CM

## 2020-02-03 DIAGNOSIS — E87.6: ICD-10-CM

## 2020-02-03 DIAGNOSIS — D69.6: ICD-10-CM

## 2020-02-03 DIAGNOSIS — E11.8: ICD-10-CM

## 2020-02-03 DIAGNOSIS — D70.1: Primary | ICD-10-CM

## 2020-02-03 DIAGNOSIS — Z88.8: ICD-10-CM

## 2020-02-03 DIAGNOSIS — E88.09: ICD-10-CM

## 2020-02-03 DIAGNOSIS — E77.8: ICD-10-CM

## 2020-02-03 DIAGNOSIS — C50.919: ICD-10-CM

## 2020-02-03 LAB
ALBUMIN SERPL-MCNC: 3.2 G/DL (ref 3.5–5)
ALBUMIN/GLOB SERPL: 1.1 {RATIO} (ref 0.8–2)
ALP SERPL-CCNC: 51 IU/L (ref 40–150)
ALT SERPL-CCNC: 7 IU/L (ref 0–55)
ANION GAP SERPL CALC-SCNC: 18.3 MMOL/L (ref 8–16)
BASOPHILS # BLD AUTO: 0 10*3/UL (ref 0–0.1)
BASOPHILS NFR BLD AUTO: 0 % (ref 0–1)
BUN SERPL-MCNC: 28 MG/DL (ref 7–26)
BUN/CREAT SERPL: 31 (ref 6–25)
CALCIUM SERPL-MCNC: 9.5 MG/DL (ref 8.4–10.2)
CHLORIDE SERPL-SCNC: 98 MMOL/L (ref 98–107)
CK MB SERPL-MCNC: 0.1 NG/ML (ref 0–5)
CK SERPL-CCNC: < 7 IU/L (ref 29–168)
CO2 SERPL-SCNC: 26 MMOL/L (ref 22–29)
DEPRECATED APTT PLAS QN: 38 SECONDS (ref 23.8–35.5)
DEPRECATED INR PLAS: 1.06
DEPRECATED NEUTROPHILS # BLD AUTO: 0.2 10*3/UL (ref 2.1–6.9)
EGFRCR SERPLBLD CKD-EPI 2021: 59 ML/MIN (ref 60–?)
EOSINOPHIL # BLD AUTO: 0 10*3/UL (ref 0–0.4)
EOSINOPHIL NFR BLD AUTO: 0 % (ref 0–6)
ERYTHROCYTE [DISTWIDTH] IN CORD BLOOD: 13.5 % (ref 11.7–14.4)
GLOBULIN PLAS-MCNC: 3 G/DL (ref 2.3–3.5)
GLUCOSE SERPLBLD-MCNC: 164 MG/DL (ref 74–118)
HCT VFR BLD AUTO: 31.2 % (ref 34.2–44.1)
HGB BLD-MCNC: 10.8 G/DL (ref 12–16)
LYMPHOCYTES # BLD: 0.1 10*3/UL (ref 1–3.2)
LYMPHOCYTES NFR BLD AUTO: 17.7 % (ref 18–39.1)
MCH RBC QN AUTO: 30.8 PG (ref 28–32)
MCHC RBC AUTO-ENTMCNC: 34.6 G/DL (ref 31–35)
MCV RBC AUTO: 88.9 FL (ref 81–99)
MONOCYTES # BLD AUTO: 0.3 10*3/UL (ref 0.2–0.8)
MONOCYTES NFR BLD AUTO: 45.2 % (ref 4.4–11.3)
NEUTS SEG NFR BLD AUTO: 35.5 % (ref 38.7–80)
PLATELET # BLD AUTO: 68 X10E3/UL (ref 140–360)
POTASSIUM SERPL-SCNC: 3.3 MMOL/L (ref 3.5–5.1)
PROTHROMBIN TIME: 14 SECONDS (ref 11.9–14.5)
RBC # BLD AUTO: 3.51 X10E6/UL (ref 3.6–5.1)
SODIUM SERPL-SCNC: 139 MMOL/L (ref 136–145)

## 2020-02-03 PROCEDURE — 82550 ASSAY OF CK (CPK): CPT

## 2020-02-03 PROCEDURE — 84484 ASSAY OF TROPONIN QUANT: CPT

## 2020-02-03 PROCEDURE — 80048 BASIC METABOLIC PNL TOTAL CA: CPT

## 2020-02-03 PROCEDURE — 80053 COMPREHEN METABOLIC PANEL: CPT

## 2020-02-03 PROCEDURE — 93005 ELECTROCARDIOGRAM TRACING: CPT

## 2020-02-03 PROCEDURE — 87040 BLOOD CULTURE FOR BACTERIA: CPT

## 2020-02-03 PROCEDURE — 85025 COMPLETE CBC W/AUTO DIFF WBC: CPT

## 2020-02-03 PROCEDURE — 81001 URINALYSIS AUTO W/SCOPE: CPT

## 2020-02-03 PROCEDURE — 83518 IMMUNOASSAY DIPSTICK: CPT

## 2020-02-03 PROCEDURE — 83605 ASSAY OF LACTIC ACID: CPT

## 2020-02-03 PROCEDURE — 85730 THROMBOPLASTIN TIME PARTIAL: CPT

## 2020-02-03 PROCEDURE — 84100 ASSAY OF PHOSPHORUS: CPT

## 2020-02-03 PROCEDURE — 96360 HYDRATION IV INFUSION INIT: CPT

## 2020-02-03 PROCEDURE — 71045 X-RAY EXAM CHEST 1 VIEW: CPT

## 2020-02-03 PROCEDURE — 87400 INFLUENZA A/B EACH AG IA: CPT

## 2020-02-03 PROCEDURE — 84443 ASSAY THYROID STIM HORMONE: CPT

## 2020-02-03 PROCEDURE — 87070 CULTURE OTHR SPECIMN AEROBIC: CPT

## 2020-02-03 PROCEDURE — 87086 URINE CULTURE/COLONY COUNT: CPT

## 2020-02-03 PROCEDURE — 36415 COLL VENOUS BLD VENIPUNCTURE: CPT

## 2020-02-03 PROCEDURE — 85610 PROTHROMBIN TIME: CPT

## 2020-02-03 PROCEDURE — 83735 ASSAY OF MAGNESIUM: CPT

## 2020-02-03 PROCEDURE — 82553 CREATINE MB FRACTION: CPT

## 2020-02-03 PROCEDURE — 99284 EMERGENCY DEPT VISIT MOD MDM: CPT

## 2020-02-03 RX ADMIN — SODIUM CHLORIDE SCH MLS/HR: 9 INJECTION, SOLUTION INTRAVENOUS at 17:14

## 2020-02-03 RX ADMIN — CEFEPIME SCH MLS/HR: 1 INJECTION, SOLUTION INTRAVENOUS at 16:00

## 2020-02-03 NOTE — DIAGNOSTIC IMAGING REPORT
EXAMINATION:  CHEST SINGLE (PORTABLE)    



INDICATION: Cough



COMPARISON: Chest radiograph of 11/5/2019

     

FINDINGS:



LINES/TUBES:Interval placement of right chest port terminating at the superior

cavoatrial junction. EKG leads overlie the chest.



LUNGS:The lungs are well-inflated. Unchanged elevation of left hemidiaphragm.



PLEURA:No pleural effusion. No pneumothorax.



MEDIASTINUM:The cardiomediastinal silhouette appears normal in size and shape.

Atherosclerotic calcifications of the thoracic aorta.



BONES/SOFT TISSUES:No acute osseous injury.



ABDOMEN:Distended stomach bubble.





IMPRESSION: 

No focal pneumonia or pulmonary edema.



Unchanged elevation of the left hemidiaphragm.



Signed by: Suzi Daniels MD on 2/3/2020 4:03 PM

## 2020-02-04 VITALS — DIASTOLIC BLOOD PRESSURE: 51 MMHG | SYSTOLIC BLOOD PRESSURE: 107 MMHG

## 2020-02-04 VITALS — SYSTOLIC BLOOD PRESSURE: 117 MMHG | DIASTOLIC BLOOD PRESSURE: 65 MMHG

## 2020-02-04 VITALS — DIASTOLIC BLOOD PRESSURE: 60 MMHG | SYSTOLIC BLOOD PRESSURE: 102 MMHG

## 2020-02-04 VITALS — DIASTOLIC BLOOD PRESSURE: 58 MMHG | SYSTOLIC BLOOD PRESSURE: 121 MMHG

## 2020-02-04 VITALS — SYSTOLIC BLOOD PRESSURE: 105 MMHG | DIASTOLIC BLOOD PRESSURE: 56 MMHG

## 2020-02-04 VITALS — DIASTOLIC BLOOD PRESSURE: 56 MMHG | SYSTOLIC BLOOD PRESSURE: 105 MMHG

## 2020-02-04 VITALS — SYSTOLIC BLOOD PRESSURE: 83 MMHG | DIASTOLIC BLOOD PRESSURE: 50 MMHG

## 2020-02-04 LAB
ALBUMIN SERPL-MCNC: 2.4 G/DL (ref 3.5–5)
ALBUMIN/GLOB SERPL: 1 {RATIO} (ref 0.8–2)
ALP SERPL-CCNC: 39 IU/L (ref 40–150)
ALT SERPL-CCNC: 6 IU/L (ref 0–55)
ANION GAP SERPL CALC-SCNC: 14.1 MMOL/L (ref 8–16)
ANISOCYTOSIS BLD QL SMEAR: SLIGHT
BACTERIA URNS QL MICRO: (no result) /HPF
BASOPHILS # BLD AUTO: 0 10*3/UL (ref 0–0.1)
BASOPHILS # BLD AUTO: 0 10*3/UL (ref 0–0.1)
BASOPHILS NFR BLD AUTO: 1.5 % (ref 0–1)
BASOPHILS NFR BLD AUTO: 1.9 % (ref 0–1)
BILIRUB UR QL: NEGATIVE
BLASTS NFR BLD MANUAL: 3 %
BUN SERPL-MCNC: 27 MG/DL (ref 7–26)
BUN/CREAT SERPL: 33 (ref 6–25)
CALCIUM SERPL-MCNC: 8.1 MG/DL (ref 8.4–10.2)
CHLORIDE SERPL-SCNC: 108 MMOL/L (ref 98–107)
CLARITY UR: (no result)
CO2 SERPL-SCNC: 22 MMOL/L (ref 22–29)
COLOR UR: YELLOW
DEPRECATED NEUTROPHILS # BLD AUTO: 0.7 10*3/UL (ref 2.1–6.9)
DEPRECATED NEUTROPHILS # BLD AUTO: 1.5 10*3/UL (ref 2.1–6.9)
DEPRECATED RBC URNS MANUAL-ACNC: (no result) /HPF (ref 0–5)
EGFRCR SERPLBLD CKD-EPI 2021: > 60 ML/MIN (ref 60–?)
EOSINOPHIL # BLD AUTO: 0 10*3/UL (ref 0–0.4)
EOSINOPHIL # BLD AUTO: 0 10*3/UL (ref 0–0.4)
EOSINOPHIL NFR BLD AUTO: 0 % (ref 0–6)
EOSINOPHIL NFR BLD AUTO: 0 % (ref 0–6)
EPI CELLS URNS QL MICRO: (no result) /LPF
ERYTHROCYTE [DISTWIDTH] IN CORD BLOOD: 13.4 % (ref 11.7–14.4)
ERYTHROCYTE [DISTWIDTH] IN CORD BLOOD: 13.7 % (ref 11.7–14.4)
GLOBULIN PLAS-MCNC: 2.5 G/DL (ref 2.3–3.5)
GLUCOSE SERPLBLD-MCNC: 131 MG/DL (ref 74–118)
HCT VFR BLD AUTO: 24.7 % (ref 34.2–44.1)
HCT VFR BLD AUTO: 25.5 % (ref 34.2–44.1)
HGB BLD-MCNC: 8.4 G/DL (ref 12–16)
HGB BLD-MCNC: 8.5 G/DL (ref 12–16)
HYPOCHROMIA BLD QL SMEAR: (no result)
KETONES UR QL STRIP.AUTO: NEGATIVE
LEUKOCYTE ESTERASE UR QL STRIP.AUTO: NEGATIVE
LYMPHOCYTES # BLD: 0.2 10*3/UL (ref 1–3.2)
LYMPHOCYTES # BLD: 0.2 10*3/UL (ref 1–3.2)
LYMPHOCYTES NFR BLD AUTO: 16 % (ref 18–39.1)
LYMPHOCYTES NFR BLD AUTO: 7.4 % (ref 18–39.1)
LYMPHOCYTES NFR BLD MANUAL: 9 % (ref 19–48)
MCH RBC QN AUTO: 29.5 PG (ref 28–32)
MCH RBC QN AUTO: 30.4 PG (ref 28–32)
MCHC RBC AUTO-ENTMCNC: 33.3 G/DL (ref 31–35)
MCHC RBC AUTO-ENTMCNC: 34 G/DL (ref 31–35)
MCV RBC AUTO: 89.5 FL (ref 81–99)
MCV RBC AUTO: 89.5 FL (ref 81–99)
MONOCYTES # BLD AUTO: 0.2 10*3/UL (ref 0.2–0.8)
MONOCYTES # BLD AUTO: 0.3 10*3/UL (ref 0.2–0.8)
MONOCYTES NFR BLD AUTO: 14.4 % (ref 4.4–11.3)
MONOCYTES NFR BLD AUTO: 19 % (ref 4.4–11.3)
MONOCYTES NFR BLD MANUAL: 11 % (ref 3.4–9)
NEUTS BAND NFR BLD MANUAL: 9 %
NEUTS SEG NFR BLD AUTO: 61.4 % (ref 38.7–80)
NEUTS SEG NFR BLD AUTO: 71.7 % (ref 38.7–80)
NEUTS SEG NFR BLD MANUAL: 63 % (ref 40–74)
NITRITE UR QL STRIP.AUTO: NEGATIVE
PLAT MORPH BLD: NORMAL
PLATELET # BLD AUTO: 40 X10E3/UL (ref 140–360)
PLATELET # BLD AUTO: 44 X10E3/UL (ref 140–360)
PLATELET # BLD EST: (no result) 10*3/UL
PLATELET # BLD EST: (no result) 10*3/UL
POIKILOCYTOSIS BLD QL SMEAR: SLIGHT
POTASSIUM SERPL-SCNC: 3.1 MMOL/L (ref 3.5–5.1)
PROT UR QL STRIP.AUTO: (no result)
RBC # BLD AUTO: 2.76 X10E6/UL (ref 3.6–5.1)
RBC # BLD AUTO: 2.85 X10E6/UL (ref 3.6–5.1)
RBC MORPH BLD: NORMAL
SODIUM SERPL-SCNC: 141 MMOL/L (ref 136–145)
SP GR UR STRIP: 1.01 (ref 1.01–1.02)
UROBILINOGEN UR STRIP-MCNC: 0.2 MG/DL (ref 0.2–1)
WBC #/AREA URNS HPF: (no result) /HPF (ref 0–5)

## 2020-02-04 RX ADMIN — SODIUM CHLORIDE SCH MLS/HR: 9 INJECTION, SOLUTION INTRAVENOUS at 15:06

## 2020-02-04 RX ADMIN — SODIUM CHLORIDE SCH MLS/HR: 9 INJECTION, SOLUTION INTRAVENOUS at 07:44

## 2020-02-04 RX ADMIN — CEFEPIME SCH MLS/HR: 1 INJECTION, SOLUTION INTRAVENOUS at 06:20

## 2020-02-04 RX ADMIN — FILGRASTIM SCH MCG: 480 INJECTION, SOLUTION INTRAVENOUS; SUBCUTANEOUS at 08:48

## 2020-02-04 RX ADMIN — METOCLOPRAMIDE PRN MG: 5 INJECTION, SOLUTION INTRAMUSCULAR; INTRAVENOUS at 21:46

## 2020-02-04 RX ADMIN — LATANOPROST SCH ML: 50 SOLUTION OPHTHALMIC at 22:18

## 2020-02-04 RX ADMIN — HYDRALAZINE HYDROCHLORIDE SCH MG: 25 TABLET ORAL at 21:45

## 2020-02-04 RX ADMIN — Medication PRN MG: at 15:30

## 2020-02-04 RX ADMIN — CEFEPIME SCH MLS/HR: 1 INJECTION, SOLUTION INTRAVENOUS at 17:12

## 2020-02-04 RX ADMIN — SODIUM CHLORIDE SCH MLS/HR: 9 INJECTION, SOLUTION INTRAVENOUS at 06:23

## 2020-02-04 NOTE — CONSULTATION
DATE OF CONSULTATION:  02/03/2020  

 

HISTORY OF PRESENT ILLNESS:  Bri Squires is an 79-year-old female with breast

cancer.  The patient had systemic chemotherapy last week.  The patient has been

receiving Neupogen as outpatient, Friday, Saturday, and Sunday, came to the office

today.  The patient was given Neupogen.  However, the daughter called that she was

running fever.  Because of the fever, the patient was suggested to come to the ER,

subsequently seen, and the patient was found to be neutropenic, even though she is on

Neupogen.  Subsequently, blood cultures were done.  The patient is on cefepime and

vancomycin. 

 

SOCIAL HISTORY:  Noncontributory.

 

FAMILY HISTORY:  Noncontributory.

 

ALLERGIES:  REPORTED NONE.

 

MEDICATIONS:  Please review the EMR.

 

REVIEW OF SYSTEMS:

HEENT:  Normal. 

CARDIAC:  Normal. 

RESPIRATORY:  Normal. 

GI:  Normal. 

:  Normal. 

MUSCULOSKELETAL:  Normal. 

SKIN AND BREASTS:  Breast cancer, estrogen receptor and progesterone receptor negative.

 

PHYSICAL EXAMINATION:

GENERAL:  A moderately built female, no palpable adenopathy. 

HEART:  Within normal limits. 

LUNGS:  Clear. 

VITAL SIGNS:  Blood pressure 110/80. 

BREASTS:  No adenopathy.  Mastectomy scar is seen. 

RECTAL:  Deferred. 

VAGINAL:  Deferred. 

CENTRAL NERVOUS SYSTEM:  Essentially normal.

LABORATORY DATA:  Lab shows a hemoglobin of 10.8, hematocrit 31.2, white count of 0.62,

and platelets are 68,000.  The patient has predominantly monocytes of 45.2%. 

 

IMPRESSION:  

1. Neutropenia.

2. Anemia.

3. Thrombocytopenia.

4. Breast cancer.

5. Hypokalemia of 3.3.

6. Hypoproteinemia of 6.2.

7. Hypoalbuminemia of 3.2.

 

PLAN:  Blood cultures, IV fluids, antibiotics.  Appropriate potassium supplement.

 

 

 

 

______________________________

Yadi Stout MD

 

MAQ/MODL

D:  02/03/2020 17:43:04

T:  02/04/2020 01:07:37

Job #:  [id number]

 

cc:            Stephen Barney MD

## 2020-02-04 NOTE — NUR
ASSESSMENT: Spiritual concern

Pt disappointed because she hasn't been able to attend [employer] Mass due to her treatments. 
Pt requesting communion. Pt's daughters (2) at bedside. 



Intervention:  Provided empathic listening and pastoral presence. Facilitated conversation 
concerning availability of communion. Requested Pastoral Visitor to bring communion.



Outcome: Pt & daughters expressed appreciation for visit and support. Will follow as able.





SHRUTHI VARGAS



Spiritual Care Department

O: [phone]

Pager: [phone] ([phone] calling from)

## 2020-02-04 NOTE — NUR
SPOKE WITH DR. RIVERA REGARDING PATIENT HAVING DIARRHEA. NEW ORDER FOR QUESTRAN 1 PACK EVERY 6 
HOURS PRN DIARRHEA. NEW ORDERS IMPLEMENTED.

## 2020-02-04 NOTE — NUR
PATIENT A/O X3, EVEN RESPIRATIONS ON RA. LUNG SOUNDS CLEAR TO AUSCULTATION. BOWEL SOUNDS 
PRESENT. RIGHT FA 18 GAUGE IV WITH NS @ 125 CC/HR. SKIN INTACT. PATIENT ON NEUTROPENIC 
ISOLATION. AMBULATES WITH STANDBY ASSISTANCE. DENIES PAIN AT THIS TIME. RIGHT UPPER CHEST 
PORT. CALL LIGHT IN REACH WILL CONTINUE TO MONITOR PATIENT.

## 2020-02-05 VITALS — DIASTOLIC BLOOD PRESSURE: 64 MMHG | SYSTOLIC BLOOD PRESSURE: 106 MMHG

## 2020-02-05 VITALS — SYSTOLIC BLOOD PRESSURE: 98 MMHG | DIASTOLIC BLOOD PRESSURE: 64 MMHG

## 2020-02-05 VITALS — SYSTOLIC BLOOD PRESSURE: 99 MMHG | DIASTOLIC BLOOD PRESSURE: 74 MMHG

## 2020-02-05 VITALS — SYSTOLIC BLOOD PRESSURE: 102 MMHG | DIASTOLIC BLOOD PRESSURE: 64 MMHG

## 2020-02-05 VITALS — SYSTOLIC BLOOD PRESSURE: 81 MMHG | DIASTOLIC BLOOD PRESSURE: 61 MMHG

## 2020-02-05 VITALS — DIASTOLIC BLOOD PRESSURE: 56 MMHG | SYSTOLIC BLOOD PRESSURE: 88 MMHG

## 2020-02-05 VITALS — DIASTOLIC BLOOD PRESSURE: 89 MMHG | SYSTOLIC BLOOD PRESSURE: 152 MMHG

## 2020-02-05 VITALS — DIASTOLIC BLOOD PRESSURE: 64 MMHG | SYSTOLIC BLOOD PRESSURE: 98 MMHG

## 2020-02-05 VITALS — SYSTOLIC BLOOD PRESSURE: 152 MMHG | DIASTOLIC BLOOD PRESSURE: 89 MMHG

## 2020-02-05 LAB
ANION GAP SERPL CALC-SCNC: 13.2 MMOL/L (ref 8–16)
BASOPHILS # BLD AUTO: 0 10*3/UL (ref 0–0.1)
BASOPHILS NFR BLD AUTO: 0.2 % (ref 0–1)
BUN SERPL-MCNC: 15 MG/DL (ref 7–26)
BUN/CREAT SERPL: 22 (ref 6–25)
CALCIUM SERPL-MCNC: 7.8 MG/DL (ref 8.4–10.2)
CHLORIDE SERPL-SCNC: 111 MMOL/L (ref 98–107)
CO2 SERPL-SCNC: 19 MMOL/L (ref 22–29)
DEPRECATED NEUTROPHILS # BLD AUTO: 3.8 10*3/UL (ref 2.1–6.9)
DEPRECATED PHOSPHATE SERPL-MCNC: 1.2 MG/DL (ref 2.3–4.7)
EGFRCR SERPLBLD CKD-EPI 2021: > 60 ML/MIN (ref 60–?)
EOSINOPHIL # BLD AUTO: 0 10*3/UL (ref 0–0.4)
EOSINOPHIL NFR BLD AUTO: 0 % (ref 0–6)
EOSINOPHIL NFR BLD MANUAL: 1 % (ref 0–7)
ERYTHROCYTE [DISTWIDTH] IN CORD BLOOD: 14 % (ref 11.7–14.4)
GLUCOSE SERPLBLD-MCNC: 110 MG/DL (ref 74–118)
HCT VFR BLD AUTO: 24.5 % (ref 34.2–44.1)
HGB BLD-MCNC: 8.3 G/DL (ref 12–16)
LYMPHOCYTES # BLD: 0.2 10*3/UL (ref 1–3.2)
LYMPHOCYTES NFR BLD AUTO: 3.2 % (ref 18–39.1)
LYMPHOCYTES NFR BLD MANUAL: 5 % (ref 19–48)
MAGNESIUM SERPL-MCNC: 1.3 MG/DL (ref 1.3–2.1)
MCH RBC QN AUTO: 29.9 PG (ref 28–32)
MCHC RBC AUTO-ENTMCNC: 33.9 G/DL (ref 31–35)
MCV RBC AUTO: 88.1 FL (ref 81–99)
MONOCYTES # BLD AUTO: 0.4 10*3/UL (ref 0.2–0.8)
MONOCYTES NFR BLD AUTO: 7.4 % (ref 4.4–11.3)
MONOCYTES NFR BLD MANUAL: 4 % (ref 3.4–9)
NEUTS SEG NFR BLD AUTO: 81.6 % (ref 38.7–80)
NEUTS SEG NFR BLD MANUAL: 90 % (ref 40–74)
PLAT MORPH BLD: NORMAL
PLATELET # BLD AUTO: 61 X10E3/UL (ref 140–360)
PLATELET # BLD EST: (no result) 10*3/UL
POTASSIUM SERPL-SCNC: 3.2 MMOL/L (ref 3.5–5.1)
RBC # BLD AUTO: 2.78 X10E6/UL (ref 3.6–5.1)
RBC MORPH BLD: NORMAL
SODIUM SERPL-SCNC: 140 MMOL/L (ref 136–145)
TSH SERPL DL<=0.005 MIU/L-ACNC: 0.86 UIU/ML (ref 0.35–4.94)

## 2020-02-05 RX ADMIN — CEFEPIME SCH MLS/HR: 1 INJECTION, SOLUTION INTRAVENOUS at 05:17

## 2020-02-05 RX ADMIN — CEFEPIME SCH MLS/HR: 1 INJECTION, SOLUTION INTRAVENOUS at 17:27

## 2020-02-05 RX ADMIN — SODIUM CHLORIDE SCH MLS/HR: 9 INJECTION, SOLUTION INTRAVENOUS at 12:37

## 2020-02-05 RX ADMIN — METOCLOPRAMIDE PRN MG: 5 INJECTION, SOLUTION INTRAMUSCULAR; INTRAVENOUS at 19:41

## 2020-02-05 RX ADMIN — SODIUM CHLORIDE SCH MLS/HR: 9 INJECTION, SOLUTION INTRAVENOUS at 04:09

## 2020-02-05 RX ADMIN — SODIUM CHLORIDE SCH MLS/HR: 9 INJECTION, SOLUTION INTRAVENOUS at 22:11

## 2020-02-05 RX ADMIN — FILGRASTIM SCH MCG: 480 INJECTION, SOLUTION INTRAVENOUS; SUBCUTANEOUS at 09:45

## 2020-02-05 RX ADMIN — Medication PRN MG: at 22:12

## 2020-02-05 RX ADMIN — METOPROLOL SUCCINATE SCH MG: 50 TABLET, EXTENDED RELEASE ORAL at 22:12

## 2020-02-05 RX ADMIN — LATANOPROST SCH ML: 50 SOLUTION OPHTHALMIC at 22:11

## 2020-02-05 RX ADMIN — Medication PRN MG: at 09:45

## 2020-02-05 RX ADMIN — HYDRALAZINE HYDROCHLORIDE SCH MG: 25 TABLET ORAL at 22:11

## 2020-02-05 NOTE — NUR
received bedside change of shift report from PM nurse, pt awake, alert, oriented X3, no 
complaints at this time. all safety measures in place.

## 2020-02-05 NOTE — NUR
Follow Up Visit

Pt states Pastoral Visitor, John Chino brought communion last night. Pt expressed 
appreciation for his support. Pt's daughter at bedside. 



Reminded pt of availability of Spiritual Care.



SHRUTHI VARGAS



Spiritual Care Department

O: [phone]

Pager: [phone] ([phone] number calling from)

## 2020-02-06 VITALS — DIASTOLIC BLOOD PRESSURE: 68 MMHG | SYSTOLIC BLOOD PRESSURE: 123 MMHG

## 2020-02-06 VITALS — DIASTOLIC BLOOD PRESSURE: 78 MMHG | SYSTOLIC BLOOD PRESSURE: 127 MMHG

## 2020-02-06 VITALS — SYSTOLIC BLOOD PRESSURE: 119 MMHG | DIASTOLIC BLOOD PRESSURE: 75 MMHG

## 2020-02-06 VITALS — SYSTOLIC BLOOD PRESSURE: 123 MMHG | DIASTOLIC BLOOD PRESSURE: 68 MMHG

## 2020-02-06 VITALS — DIASTOLIC BLOOD PRESSURE: 80 MMHG | SYSTOLIC BLOOD PRESSURE: 138 MMHG

## 2020-02-06 VITALS — SYSTOLIC BLOOD PRESSURE: 132 MMHG | DIASTOLIC BLOOD PRESSURE: 77 MMHG

## 2020-02-06 VITALS — DIASTOLIC BLOOD PRESSURE: 71 MMHG | SYSTOLIC BLOOD PRESSURE: 127 MMHG

## 2020-02-06 LAB
ANION GAP SERPL CALC-SCNC: 10 MMOL/L (ref 8–16)
BUN SERPL-MCNC: 12 MG/DL (ref 7–26)
BUN/CREAT SERPL: 18 (ref 6–25)
CALCIUM SERPL-MCNC: 7.7 MG/DL (ref 8.4–10.2)
CHLORIDE SERPL-SCNC: 113 MMOL/L (ref 98–107)
CO2 SERPL-SCNC: 20 MMOL/L (ref 22–29)
DEPRECATED PHOSPHATE SERPL-MCNC: 0.9 MG/DL (ref 2.3–4.7)
EGFRCR SERPLBLD CKD-EPI 2021: > 60 ML/MIN (ref 60–?)
GLUCOSE SERPLBLD-MCNC: 115 MG/DL (ref 74–118)
MAGNESIUM SERPL-MCNC: 1.2 MG/DL (ref 1.3–2.1)
POTASSIUM SERPL-SCNC: 3 MMOL/L (ref 3.5–5.1)
SODIUM SERPL-SCNC: 140 MMOL/L (ref 136–145)

## 2020-02-06 RX ADMIN — FILGRASTIM SCH MCG: 480 INJECTION, SOLUTION INTRAVENOUS; SUBCUTANEOUS at 11:35

## 2020-02-06 RX ADMIN — METOCLOPRAMIDE PRN MG: 5 INJECTION, SOLUTION INTRAMUSCULAR; INTRAVENOUS at 11:45

## 2020-02-06 RX ADMIN — HYDRALAZINE HYDROCHLORIDE SCH MG: 25 TABLET ORAL at 21:25

## 2020-02-06 RX ADMIN — LATANOPROST SCH ML: 50 SOLUTION OPHTHALMIC at 21:24

## 2020-02-06 RX ADMIN — CEFEPIME SCH MLS/HR: 1 INJECTION, SOLUTION INTRAVENOUS at 20:00

## 2020-02-06 RX ADMIN — METOPROLOL SUCCINATE SCH MG: 50 TABLET, EXTENDED RELEASE ORAL at 21:25

## 2020-02-06 RX ADMIN — CEFEPIME SCH MLS/HR: 1 INJECTION, SOLUTION INTRAVENOUS at 05:00

## 2020-02-06 RX ADMIN — SODIUM CHLORIDE SCH MLS/HR: 9 INJECTION, SOLUTION INTRAVENOUS at 09:58

## 2020-02-06 NOTE — NUR
BEDSIDE SHIFT REPORT RECEIVED FROM NIGHT SHIFT RN, PT RESTING IN BED, EASILY AROUSED, NO 
SIGNS OF DISTRESS. NO COMPLAINTS AT THIS TIME.

## 2020-02-06 NOTE — NUR
Assessment done.no resp.distress.no pain voiced.ambulatory.stable condition.bed locked  and 
in lowest position.phone and call light within reach.instructed to call for assistance as 
needed.

## 2020-02-06 NOTE — NUR
Assisted the patient to use rest room.voided.had a small bowelmovement.patient is back to 
bed safely.

## 2020-02-07 VITALS — SYSTOLIC BLOOD PRESSURE: 146 MMHG | DIASTOLIC BLOOD PRESSURE: 97 MMHG

## 2020-02-07 VITALS — DIASTOLIC BLOOD PRESSURE: 55 MMHG | SYSTOLIC BLOOD PRESSURE: 115 MMHG

## 2020-02-07 VITALS — DIASTOLIC BLOOD PRESSURE: 97 MMHG | SYSTOLIC BLOOD PRESSURE: 146 MMHG

## 2020-02-07 VITALS — DIASTOLIC BLOOD PRESSURE: 78 MMHG | SYSTOLIC BLOOD PRESSURE: 119 MMHG

## 2020-02-07 VITALS — DIASTOLIC BLOOD PRESSURE: 83 MMHG | SYSTOLIC BLOOD PRESSURE: 137 MMHG

## 2020-02-07 VITALS — SYSTOLIC BLOOD PRESSURE: 161 MMHG | DIASTOLIC BLOOD PRESSURE: 90 MMHG

## 2020-02-07 VITALS — DIASTOLIC BLOOD PRESSURE: 75 MMHG | SYSTOLIC BLOOD PRESSURE: 138 MMHG

## 2020-02-07 LAB
ANION GAP SERPL CALC-SCNC: 11.3 MMOL/L (ref 8–16)
BASOPHILS # BLD AUTO: 0 10*3/UL (ref 0–0.1)
BASOPHILS NFR BLD AUTO: 0.2 % (ref 0–1)
BUN SERPL-MCNC: 9 MG/DL (ref 7–26)
BUN/CREAT SERPL: 15 (ref 6–25)
CALCIUM SERPL-MCNC: 7.6 MG/DL (ref 8.4–10.2)
CHLORIDE SERPL-SCNC: 109 MMOL/L (ref 98–107)
CO2 SERPL-SCNC: 24 MMOL/L (ref 22–29)
DEPRECATED NEUTROPHILS # BLD AUTO: 10.6 10*3/UL (ref 2.1–6.9)
DEPRECATED PHOSPHATE SERPL-MCNC: 1.4 MG/DL (ref 2.3–4.7)
EGFRCR SERPLBLD CKD-EPI 2021: > 60 ML/MIN (ref 60–?)
EOSINOPHIL # BLD AUTO: 0 10*3/UL (ref 0–0.4)
EOSINOPHIL NFR BLD AUTO: 0.1 % (ref 0–6)
ERYTHROCYTE [DISTWIDTH] IN CORD BLOOD: 14.4 % (ref 11.7–14.4)
GLUCOSE SERPLBLD-MCNC: 122 MG/DL (ref 74–118)
HCT VFR BLD AUTO: 25.3 % (ref 34.2–44.1)
HGB BLD-MCNC: 8.6 G/DL (ref 12–16)
LYMPHOCYTES # BLD: 0.3 10*3/UL (ref 1–3.2)
LYMPHOCYTES NFR BLD AUTO: 2.1 % (ref 18–39.1)
LYMPHOCYTES NFR BLD MANUAL: 4 % (ref 19–48)
MAGNESIUM SERPL-MCNC: 1.6 MG/DL (ref 1.3–2.1)
MCH RBC QN AUTO: 29.7 PG (ref 28–32)
MCHC RBC AUTO-ENTMCNC: 34 G/DL (ref 31–35)
MCV RBC AUTO: 87.2 FL (ref 81–99)
MONOCYTES # BLD AUTO: 0.6 10*3/UL (ref 0.2–0.8)
MONOCYTES NFR BLD AUTO: 4.8 % (ref 4.4–11.3)
MONOCYTES NFR BLD MANUAL: 3 % (ref 3.4–9)
NEUTS SEG NFR BLD AUTO: 86.7 % (ref 38.7–80)
NEUTS SEG NFR BLD MANUAL: 93 % (ref 40–74)
PLAT MORPH BLD: NORMAL
PLATELET # BLD AUTO: 59 X10E3/UL (ref 140–360)
PLATELET # BLD EST: (no result) 10*3/UL
POTASSIUM SERPL-SCNC: 3.3 MMOL/L (ref 3.5–5.1)
RBC # BLD AUTO: 2.9 X10E6/UL (ref 3.6–5.1)
RBC MORPH BLD: NORMAL
SODIUM SERPL-SCNC: 141 MMOL/L (ref 136–145)

## 2020-02-07 RX ADMIN — METOCLOPRAMIDE PRN MG: 5 INJECTION, SOLUTION INTRAMUSCULAR; INTRAVENOUS at 21:17

## 2020-02-07 RX ADMIN — FILGRASTIM SCH MCG: 480 INJECTION, SOLUTION INTRAVENOUS; SUBCUTANEOUS at 09:00

## 2020-02-07 RX ADMIN — METOPROLOL SUCCINATE SCH MG: 50 TABLET, EXTENDED RELEASE ORAL at 21:19

## 2020-02-07 RX ADMIN — FLUCONAZOLE SCH MG: 100 TABLET ORAL at 10:22

## 2020-02-07 RX ADMIN — LATANOPROST SCH ML: 50 SOLUTION OPHTHALMIC at 21:17

## 2020-02-07 RX ADMIN — Medication PRN MG: at 00:43

## 2020-02-07 RX ADMIN — METOCLOPRAMIDE PRN MG: 5 INJECTION, SOLUTION INTRAMUSCULAR; INTRAVENOUS at 00:21

## 2020-02-07 RX ADMIN — SODIUM CHLORIDE SCH MLS/HR: 9 INJECTION, SOLUTION INTRAVENOUS at 01:06

## 2020-02-07 RX ADMIN — Medication PRN MG: at 21:17

## 2020-02-07 RX ADMIN — HYDRALAZINE HYDROCHLORIDE SCH MG: 25 TABLET ORAL at 21:19

## 2020-02-07 RX ADMIN — Medication SCH MG: at 10:35

## 2020-02-07 RX ADMIN — Medication SCH MG: at 18:48

## 2020-02-07 RX ADMIN — CEFEPIME SCH MLS/HR: 1 INJECTION, SOLUTION INTRAVENOUS at 05:30

## 2020-02-07 NOTE — NUR
Assisted to use rest room.voided.patient is back to bed safely.has nausea.medicated with 
inj.reglan iv.no pain voiced.no resp.distress.

## 2020-02-07 NOTE — NUR
RECEIVED THE PATIENT IN REPORT.LYEING IN THE BED.NO RESP.DISTRESS NO PAIN VOICED.STABLE 
CONDITION.BED LOCKED AND IN LOWEST POSITION.PHONE AND CALL LIGHT WITHIN REACH.INSTRUCTED TO 
CALL FOR ASSISTANCE AS NEEDED.

## 2020-02-08 VITALS — DIASTOLIC BLOOD PRESSURE: 67 MMHG | SYSTOLIC BLOOD PRESSURE: 113 MMHG

## 2020-02-08 VITALS — DIASTOLIC BLOOD PRESSURE: 69 MMHG | SYSTOLIC BLOOD PRESSURE: 145 MMHG

## 2020-02-08 VITALS — SYSTOLIC BLOOD PRESSURE: 107 MMHG | DIASTOLIC BLOOD PRESSURE: 65 MMHG

## 2020-02-08 VITALS — SYSTOLIC BLOOD PRESSURE: 145 MMHG | DIASTOLIC BLOOD PRESSURE: 69 MMHG

## 2020-02-08 LAB
ANION GAP SERPL CALC-SCNC: 14.4 MMOL/L (ref 8–16)
BUN SERPL-MCNC: 9 MG/DL (ref 7–26)
BUN/CREAT SERPL: 16 (ref 6–25)
CALCIUM SERPL-MCNC: 7.7 MG/DL (ref 8.4–10.2)
CHLORIDE SERPL-SCNC: 105 MMOL/L (ref 98–107)
CO2 SERPL-SCNC: 22 MMOL/L (ref 22–29)
DEPRECATED PHOSPHATE SERPL-MCNC: 2.1 MG/DL (ref 2.3–4.7)
EGFRCR SERPLBLD CKD-EPI 2021: > 60 ML/MIN (ref 60–?)
GLUCOSE SERPLBLD-MCNC: 94 MG/DL (ref 74–118)
MAGNESIUM SERPL-MCNC: 1.7 MG/DL (ref 1.3–2.1)
POTASSIUM SERPL-SCNC: 3.4 MMOL/L (ref 3.5–5.1)
SODIUM SERPL-SCNC: 138 MMOL/L (ref 136–145)

## 2020-02-08 RX ADMIN — Medication SCH MG: at 08:36

## 2020-02-08 RX ADMIN — FLUCONAZOLE SCH MG: 100 TABLET ORAL at 08:37

## 2020-02-09 NOTE — DISCHARGE SUMMARY
PRIMARY CARE PHYSICIAN:  Dr. Genoveva Farris.

 

CONSULTANT:  Yadi Stout MD

 

FINAL DIAGNOSES:  

1. Chemotherapy-induced neutropenic fever.  The patient's neutropenia resolved.

2. Electrolyte disorder, correcting.

 

SUMMARY:  An 79-year-old female, recently had a very strong high potency chemotherapy

for her breast cancer.  The patient subsequently came in with severe neutropenia.  The

patient's WBC was severely low at 0.62.  The patient was placed immediately on Neupogen.

 Her WBC now is 12.1.  Platelet from 40 to 59.  The patient is stable.  She is

comfortable.  Workup for source of infection did not review any obvious other than

moderate urine bacteria.  The microbiology showed otherwise yeast.  No gross blood

culture.  The patient was getting cefepime IV and was subsequently on oral Cipro and

Diflucan.  She is doing much better now.  No fever.  She is doing well back to baseline.

 The patient was discharged home today.  I will give the patient magnesium oxide 400 mg

twice a day and potassium 10 mEq daily.  The patient is on losartan-HCTZ at home.  She

will resume that for her blood pressure.  Otherwise, the patient is stable.  She will

need to follow up with her family doctor within a week and Dr. Yadi Stout for her chemotherapy planning in the future.  The patient is otherwise

stable, discharged home today. 

 

 

 

 

______________________________

MD MATTHIAS Ramsay/FELIPEL

D:  02/08/2020 09:52:21

T:  02/09/2020 02:43:08

Job #:  [id number]

## 2020-02-11 ENCOUNTER — HOSPITAL ENCOUNTER (INPATIENT)
Dept: HOSPITAL 88 - ER | Age: 80
LOS: 7 days | Discharge: NURSING, REHAB OR LONG-TERM CARE | DRG: 64 | End: 2020-02-18
Attending: INTERNAL MEDICINE | Admitting: INTERNAL MEDICINE
Payer: MEDICARE

## 2020-02-11 VITALS — BODY MASS INDEX: 23.55 KG/M2 | WEIGHT: 128 LBS | HEIGHT: 62 IN

## 2020-02-11 VITALS — DIASTOLIC BLOOD PRESSURE: 76 MMHG | SYSTOLIC BLOOD PRESSURE: 139 MMHG

## 2020-02-11 DIAGNOSIS — I10: ICD-10-CM

## 2020-02-11 DIAGNOSIS — I48.0: ICD-10-CM

## 2020-02-11 DIAGNOSIS — I63.9: Primary | ICD-10-CM

## 2020-02-11 DIAGNOSIS — G93.41: ICD-10-CM

## 2020-02-11 DIAGNOSIS — D68.59: ICD-10-CM

## 2020-02-11 DIAGNOSIS — I35.1: ICD-10-CM

## 2020-02-11 DIAGNOSIS — C50.919: ICD-10-CM

## 2020-02-11 DIAGNOSIS — N39.0: ICD-10-CM

## 2020-02-11 DIAGNOSIS — E43: ICD-10-CM

## 2020-02-11 DIAGNOSIS — Z90.13: ICD-10-CM

## 2020-02-11 DIAGNOSIS — A04.72: ICD-10-CM

## 2020-02-11 DIAGNOSIS — Z85.3: ICD-10-CM

## 2020-02-11 LAB
ALBUMIN SERPL-MCNC: 2.8 G/DL (ref 3.5–5)
ALBUMIN/GLOB SERPL: 0.8 {RATIO} (ref 0.8–2)
ALP SERPL-CCNC: 71 IU/L (ref 40–150)
ALT SERPL-CCNC: 14 IU/L (ref 0–55)
ANION GAP SERPL CALC-SCNC: 16 MMOL/L (ref 8–16)
BACTERIA URNS QL MICRO: (no result) /HPF
BASOPHILS # BLD AUTO: 0 10*3/UL (ref 0–0.1)
BASOPHILS NFR BLD AUTO: 0.4 % (ref 0–1)
BILIRUB UR QL: NEGATIVE
BUN SERPL-MCNC: 8 MG/DL (ref 7–26)
BUN/CREAT SERPL: 13 (ref 6–25)
CALCIUM SERPL-MCNC: 9.2 MG/DL (ref 8.4–10.2)
CHLORIDE SERPL-SCNC: 100 MMOL/L (ref 98–107)
CK MB SERPL-MCNC: 2.9 NG/ML (ref 0–5)
CK SERPL-CCNC: 12 IU/L (ref 29–168)
CLARITY UR: CLEAR
CO2 SERPL-SCNC: 26 MMOL/L (ref 22–29)
COLOR UR: YELLOW
DEPRECATED APTT PLAS QN: 25.8 SECONDS (ref 23.8–35.5)
DEPRECATED INR PLAS: 0.92
DEPRECATED NEUTROPHILS # BLD AUTO: 5.6 10*3/UL (ref 2.1–6.9)
DEPRECATED RBC URNS MANUAL-ACNC: (no result) /HPF (ref 0–5)
EGFRCR SERPLBLD CKD-EPI 2021: > 60 ML/MIN (ref 60–?)
EOSINOPHIL # BLD AUTO: 0 10*3/UL (ref 0–0.4)
EOSINOPHIL NFR BLD AUTO: 0 % (ref 0–6)
EPI CELLS URNS QL MICRO: (no result) /LPF
ERYTHROCYTE [DISTWIDTH] IN CORD BLOOD: 14.8 % (ref 11.7–14.4)
GLOBULIN PLAS-MCNC: 3.5 G/DL (ref 2.3–3.5)
GLUCOSE SERPLBLD-MCNC: 109 MG/DL (ref 74–118)
HCT VFR BLD AUTO: 32.6 % (ref 34.2–44.1)
HGB BLD-MCNC: 10.9 G/DL (ref 12–16)
KETONES UR QL STRIP.AUTO: NEGATIVE
LEUKOCYTE ESTERASE UR QL STRIP.AUTO: (no result)
LYMPHOCYTES # BLD: 0.8 10*3/UL (ref 1–3.2)
LYMPHOCYTES NFR BLD AUTO: 11.5 % (ref 18–39.1)
MAGNESIUM SERPL-MCNC: 1.6 MG/DL (ref 1.3–2.1)
MCH RBC QN AUTO: 29.8 PG (ref 28–32)
MCHC RBC AUTO-ENTMCNC: 33.4 G/DL (ref 31–35)
MCV RBC AUTO: 89.1 FL (ref 81–99)
MONOCYTES # BLD AUTO: 0.8 10*3/UL (ref 0.2–0.8)
MONOCYTES NFR BLD AUTO: 10.2 % (ref 4.4–11.3)
NEUTS SEG NFR BLD AUTO: 76.4 % (ref 38.7–80)
NITRITE UR QL STRIP.AUTO: NEGATIVE
PLATELET # BLD AUTO: 254 X10E3/UL (ref 140–360)
POTASSIUM SERPL-SCNC: 4 MMOL/L (ref 3.5–5.1)
PROT UR QL STRIP.AUTO: NEGATIVE
PROTHROMBIN TIME: 12.9 SECONDS (ref 11.9–14.5)
RBC # BLD AUTO: 3.66 X10E6/UL (ref 3.6–5.1)
SODIUM SERPL-SCNC: 138 MMOL/L (ref 136–145)
SP GR UR STRIP: 1.02 (ref 1.01–1.02)
UROBILINOGEN UR STRIP-MCNC: 0.2 MG/DL (ref 0.2–1)
WBC #/AREA URNS HPF: (no result) /HPF (ref 0–5)

## 2020-02-11 PROCEDURE — 70470 CT HEAD/BRAIN W/O & W/DYE: CPT

## 2020-02-11 PROCEDURE — 84100 ASSAY OF PHOSPHORUS: CPT

## 2020-02-11 PROCEDURE — 85613 RUSSELL VIPER VENOM DILUTED: CPT

## 2020-02-11 PROCEDURE — 87493 C DIFF AMPLIFIED PROBE: CPT

## 2020-02-11 PROCEDURE — 81001 URINALYSIS AUTO W/SCOPE: CPT

## 2020-02-11 PROCEDURE — 80048 BASIC METABOLIC PNL TOTAL CA: CPT

## 2020-02-11 PROCEDURE — 87186 SC STD MICRODIL/AGAR DIL: CPT

## 2020-02-11 PROCEDURE — 70498 CT ANGIOGRAPHY NECK: CPT

## 2020-02-11 PROCEDURE — 83605 ASSAY OF LACTIC ACID: CPT

## 2020-02-11 PROCEDURE — 85610 PROTHROMBIN TIME: CPT

## 2020-02-11 PROCEDURE — 93306 TTE W/DOPPLER COMPLETE: CPT

## 2020-02-11 PROCEDURE — 70496 CT ANGIOGRAPHY HEAD: CPT

## 2020-02-11 PROCEDURE — 82948 REAGENT STRIP/BLOOD GLUCOSE: CPT

## 2020-02-11 PROCEDURE — 83735 ASSAY OF MAGNESIUM: CPT

## 2020-02-11 PROCEDURE — 85305 CLOT INHIBIT PROT S TOTAL: CPT

## 2020-02-11 PROCEDURE — 82550 ASSAY OF CK (CPK): CPT

## 2020-02-11 PROCEDURE — 36415 COLL VENOUS BLD VENIPUNCTURE: CPT

## 2020-02-11 PROCEDURE — 80202 ASSAY OF VANCOMYCIN: CPT

## 2020-02-11 PROCEDURE — 86039 ANTINUCLEAR ANTIBODIES (ANA): CPT

## 2020-02-11 PROCEDURE — 85730 THROMBOPLASTIN TIME PARTIAL: CPT

## 2020-02-11 PROCEDURE — 84165 PROTEIN E-PHORESIS SERUM: CPT

## 2020-02-11 PROCEDURE — 97139 UNLISTED THERAPEUTIC PX: CPT

## 2020-02-11 PROCEDURE — 86376 MICROSOMAL ANTIBODY EACH: CPT

## 2020-02-11 PROCEDURE — 87086 URINE CULTURE/COLONY COUNT: CPT

## 2020-02-11 PROCEDURE — 71045 X-RAY EXAM CHEST 1 VIEW: CPT

## 2020-02-11 PROCEDURE — 85025 COMPLETE CBC W/AUTO DIFF WBC: CPT

## 2020-02-11 PROCEDURE — 82553 CREATINE MB FRACTION: CPT

## 2020-02-11 PROCEDURE — 87400 INFLUENZA A/B EACH AG IA: CPT

## 2020-02-11 PROCEDURE — 80053 COMPREHEN METABOLIC PANEL: CPT

## 2020-02-11 PROCEDURE — 80061 LIPID PANEL: CPT

## 2020-02-11 PROCEDURE — 85302 CLOT INHIBIT PROT C ANTIGEN: CPT

## 2020-02-11 PROCEDURE — 99284 EMERGENCY DEPT VISIT MOD MDM: CPT

## 2020-02-11 PROCEDURE — 87040 BLOOD CULTURE FOR BACTERIA: CPT

## 2020-02-11 PROCEDURE — 85597 PHOSPHOLIPID PLTLT NEUTRALIZ: CPT

## 2020-02-11 PROCEDURE — 82270 OCCULT BLOOD FECES: CPT

## 2020-02-11 PROCEDURE — 93005 ELECTROCARDIOGRAM TRACING: CPT

## 2020-02-11 PROCEDURE — 83090 ASSAY OF HOMOCYSTEINE: CPT

## 2020-02-11 PROCEDURE — 84484 ASSAY OF TROPONIN QUANT: CPT

## 2020-02-11 PROCEDURE — 86157 COLD AGGLUTININ TITER: CPT

## 2020-02-11 RX ADMIN — SODIUM CHLORIDE SCH MLS/HR: 9 INJECTION, SOLUTION INTRAVENOUS at 18:14

## 2020-02-11 RX ADMIN — SODIUM CHLORIDE PRN MG: 900 INJECTION INTRAVENOUS at 18:14

## 2020-02-11 RX ADMIN — CEFTRIAXONE SCH MLS/HR: 100 INJECTION, POWDER, FOR SOLUTION INTRAVENOUS at 18:14

## 2020-02-11 NOTE — NUR
Report handed off to VIOLETA Montiel at this time. Pt A/A/Ox4, no complaints of pain 
or distress. Care relinquished.

## 2020-02-11 NOTE — NUR
Accessed pt's port to R chest via sterile technique. 20g 1in Durand needle 
inserted to R chest port x 1 attempt. Positive blood return noted. Flushed port 
with 10ml NS, pt currently in CT. This RN did not pack port with Heparin d/t 
actively using for CT scan. Patient tolerated procedure well.

## 2020-02-11 NOTE — DIAGNOSTIC IMAGING REPORT
CT BRAIN WOW



HISTORY: Breast cancer, altered mental status



COMPARISON:  Report from head CT dated 12/14/2015



TECHNIQUE: 

CT of the head was performed before and after the administration of intravenous

contrast. Coronal/sagittal reformations were created.  One or more of the

following dose reduction techniques were used: Automated exposure control,

adjustment of the mA and/or kV according to patient size, and/or utilization of

iterative reconstruction technique.



DISCUSSION:



Scalp/Skull: No abnormalities.

Brain sulci: Mildly prominent.

Ventricles: Mild compensatory dilatation.

Extra-axial spaces: No masses or fluid collections.  Carotid siphon and

vertebral artery calcifications are present.



Parenchyma: 

Focal loss of gray-white differentiation along the right inferomedial

temporal-is associated with occipital convexity is associated with local sulcal

effacement. There is no definite associated abnormal enhancement or acute

hemorrhage. This is compatible with acute ischemia. Mass effect is local

without significant brain herniation. There is associated abrupt cut off of the

right posterior cerebral artery P3 segment.



Mild periventricular white matter hypodensities are likely chronic

microvascular ischemic changes. Otherwise, no mass or hemorrhage is seen.



Dural sinuses:  No abnormal densities.

Sellar/Suprasellar region: Intact.

Skull base: Intact.

Incidental findings:  Trace bilateral mastoid effusions are present. Left

ocular lens replacement.



IMPRESSION:

1.  Acute ischemia/cytotoxic edema throughout the right posterior cerebral

artery territory (cortical branch). No hemorrhagic conversion. Mass effect is

local without significant brain herniation.

2.  Associated abrupt cut off of the right posterior cerebral artery P3 segment

may be due to embolus or thrombus.

3.  No other acute intracranial abnormalities.

4.  Mild supratentorial chronic microvascular ischemic change.

5.  Mild generalized cerebral volume loss.



Items 1 and 2 were discussed with Dr. Bernard at 6:16 PM on 2/11/2020 (by Dr. Alanis).



Signed by: Dr. Izaiah Alanis M.D. on 2/11/2020 6:18 PM

## 2020-02-11 NOTE — DIAGNOSTIC IMAGING REPORT
Chest, portable AP view



History: Weakness



Comparison: 2/3/2020 and 11/5/2019



IMPRESSION:



The cardiac silhouette is stable in size and configuration. There is unchanged

elevation of the left hemidiaphragm. Right IJ chest port is in place with

distal catheter tip terminating at the cavoatrial junction. A patchy opacity is

identified in the right infrahilar region for which pneumonitis should be

clinically excluded. No sizable pleural effusion or pneumothorax.



Signed by: Stephen E Dreyer, MD on 2/11/2020 3:47 PM

## 2020-02-12 VITALS — DIASTOLIC BLOOD PRESSURE: 76 MMHG | SYSTOLIC BLOOD PRESSURE: 134 MMHG

## 2020-02-12 VITALS — DIASTOLIC BLOOD PRESSURE: 67 MMHG | SYSTOLIC BLOOD PRESSURE: 108 MMHG

## 2020-02-12 VITALS — DIASTOLIC BLOOD PRESSURE: 75 MMHG | SYSTOLIC BLOOD PRESSURE: 119 MMHG

## 2020-02-12 VITALS — SYSTOLIC BLOOD PRESSURE: 139 MMHG | DIASTOLIC BLOOD PRESSURE: 76 MMHG

## 2020-02-12 VITALS — DIASTOLIC BLOOD PRESSURE: 70 MMHG | SYSTOLIC BLOOD PRESSURE: 145 MMHG

## 2020-02-12 VITALS — SYSTOLIC BLOOD PRESSURE: 132 MMHG | DIASTOLIC BLOOD PRESSURE: 71 MMHG

## 2020-02-12 VITALS — SYSTOLIC BLOOD PRESSURE: 108 MMHG | DIASTOLIC BLOOD PRESSURE: 64 MMHG

## 2020-02-12 VITALS — DIASTOLIC BLOOD PRESSURE: 76 MMHG | SYSTOLIC BLOOD PRESSURE: 139 MMHG

## 2020-02-12 LAB
ANION GAP SERPL CALC-SCNC: 16.7 MMOL/L (ref 8–16)
BASOPHILS # BLD AUTO: 0 10*3/UL (ref 0–0.1)
BASOPHILS NFR BLD AUTO: 0.1 % (ref 0–1)
BUN SERPL-MCNC: 7 MG/DL (ref 7–26)
BUN/CREAT SERPL: 11 (ref 6–25)
CALCIUM SERPL-MCNC: 8.8 MG/DL (ref 8.4–10.2)
CHLORIDE SERPL-SCNC: 100 MMOL/L (ref 98–107)
CHOLEST SERPL-MCNC: 137 MD/DL (ref 0–199)
CHOLEST/HDLC SERPL: 7.6 {RATIO} (ref 3–3.6)
CK MB SERPL-MCNC: 1.2 NG/ML (ref 0–5)
CK MB SERPL-MCNC: 1.4 NG/ML (ref 0–5)
CK MB SERPL-MCNC: 3.1 NG/ML (ref 0–5)
CK SERPL-CCNC: 8 IU/L (ref 29–168)
CK SERPL-CCNC: 9 IU/L (ref 29–168)
CK SERPL-CCNC: 9 IU/L (ref 29–168)
CO2 SERPL-SCNC: 25 MMOL/L (ref 22–29)
DEPRECATED NEUTROPHILS # BLD AUTO: 6 10*3/UL (ref 2.1–6.9)
EGFRCR SERPLBLD CKD-EPI 2021: > 60 ML/MIN (ref 60–?)
EOSINOPHIL # BLD AUTO: 0 10*3/UL (ref 0–0.4)
EOSINOPHIL NFR BLD AUTO: 0 % (ref 0–6)
ERYTHROCYTE [DISTWIDTH] IN CORD BLOOD: 14.7 % (ref 11.7–14.4)
GLUCOSE SERPLBLD-MCNC: 97 MG/DL (ref 74–118)
HCT VFR BLD AUTO: 26.9 % (ref 34.2–44.1)
HDLC SERPL-MSCNC: 18 MG/DL (ref 40–60)
HGB BLD-MCNC: 8.9 G/DL (ref 12–16)
LDLC SERPL CALC-MCNC: 96 MG/DL (ref 60–130)
LYMPHOCYTES # BLD: 0.6 10*3/UL (ref 1–3.2)
LYMPHOCYTES NFR BLD AUTO: 8.2 % (ref 18–39.1)
MCH RBC QN AUTO: 30 PG (ref 28–32)
MCHC RBC AUTO-ENTMCNC: 33.1 G/DL (ref 31–35)
MCV RBC AUTO: 90.6 FL (ref 81–99)
MONOCYTES # BLD AUTO: 1 10*3/UL (ref 0.2–0.8)
MONOCYTES NFR BLD AUTO: 13.3 % (ref 4.4–11.3)
NEUTS SEG NFR BLD AUTO: 76.9 % (ref 38.7–80)
PLATELET # BLD AUTO: 223 X10E3/UL (ref 140–360)
POTASSIUM SERPL-SCNC: 3.7 MMOL/L (ref 3.5–5.1)
RBC # BLD AUTO: 2.97 X10E6/UL (ref 3.6–5.1)
SODIUM SERPL-SCNC: 138 MMOL/L (ref 136–145)
TRIGL SERPL-MCNC: 116 MG/DL (ref 0–149)

## 2020-02-12 RX ADMIN — METOPROLOL TARTRATE SCH MG: 25 TABLET, FILM COATED ORAL at 21:30

## 2020-02-12 RX ADMIN — Medication SCH MG: at 18:27

## 2020-02-12 RX ADMIN — Medication SCH MG: at 09:22

## 2020-02-12 RX ADMIN — CEFTRIAXONE SCH MLS/HR: 100 INJECTION, POWDER, FOR SOLUTION INTRAVENOUS at 05:17

## 2020-02-12 RX ADMIN — ATORVASTATIN CALCIUM SCH MG: 20 TABLET, FILM COATED ORAL at 21:30

## 2020-02-12 RX ADMIN — CEFTRIAXONE SCH MLS/HR: 100 INJECTION, POWDER, FOR SOLUTION INTRAVENOUS at 18:27

## 2020-02-12 RX ADMIN — VANCOMYCIN HYDROCHLORIDE SCH MG: 250 CAPSULE ORAL at 21:30

## 2020-02-12 RX ADMIN — LATANOPROST SCH ML: 50 SOLUTION OPHTHALMIC at 21:00

## 2020-02-12 RX ADMIN — SODIUM CHLORIDE SCH MLS/HR: 9 INJECTION, SOLUTION INTRAVENOUS at 00:16

## 2020-02-12 RX ADMIN — SODIUM CHLORIDE PRN MG: 900 INJECTION INTRAVENOUS at 00:17

## 2020-02-12 RX ADMIN — Medication SCH MLS/HR: at 21:30

## 2020-02-12 NOTE — NUR
Received patient lying in bed with eyes open. Respiration even and unlabored without SOB. 
call light in reach.

## 2020-02-12 NOTE — DIAGNOSTIC IMAGING REPORT
History:Breast cancer, acute stroke, 



Comparison studies:

None



Technique: 

Axial images were obtained from the thoracic inlet through the brain.

Coronal and sagittal images reconstructed from the axial data.

Dose modulation, iterative reconstruction, and/or weight based adjustment 

of the mA/kV was utilized to reduce the radiation dose to as low as reasonably 

achievable.



Intravenous contrast: 100 cc of Omnipaque 300.



Findings:



Aortic arch and major vessels:

Patent but associated with scattered non-stenosing calcified plaques.



Common carotid arteries:

Patent. No abnormalities.



Carotid bulbs:

Patent. No abnormalities.



Internal carotid arteries:

Patent cervical segment.

Non-stenosing punctate calcified plaques in the carotid siphons.

Patent A1 and M1 segments.



Vertebral arteries:

Left slightly dominant.

Patent. No abnormalities.



Basilar artery:

Patent. No abnormalities.



Posterior cerebral arteries:

Patent. No abnormalities.



Anatomical variants:

Acom: Vaguely visualized

Pcoms: Not visualized

Vertebral arteries: Left slightly dominant



Incidental finding:

Patient status post anterior cervical fusion from C4 to C7.

The intervening disc spaces are fused.

No significant foraminal or spinal canal stenosis.



IMPRESSION:



Cervical CTA:



1.  No abnormalities.



2.  Specifically, no significant atherosclerotic disease or vessel occlusions

in the vertebral arteries, carotid bulbs or cervical internal carotid arteries



Intracranial CTA:



1.  Evaluation of the distal P2 and P3 segments of the right posterior cerebral

artery is obscured by the overlapping basal vein of Jasmyn, it is presumed

to be focally occluded as described on the head CT with contrast as it would

explain the distal right PCA vascular insult described and such study.



2.  Otherwise, no significant abnormalities.



3.  Incidental nonstenosing calcified plaques in the carotid siphons.



Signed by: Dr. Maurice Faustin M.D. on 2/12/2020 6:45 PM

## 2020-02-12 NOTE — NUR
Nutrition Intervention Note



RD Recommendation(s) for Physician: 

-Consider regular diet per MD to provide the pt with more options on the menu.

-Recommend Ensure Enlive TID as tolerated. 

-Encouraged intake as tolerated. 



Plan of Care: RD following, monitoring for tolerance and adequacy. Ensure Enlive TID.



Nutrition reason for involvement:

(MST-2)



RD Assessment

2/12: 79 YOF admitted for AMS with PMH listed below. The pt was recently d/c from this 
hospital on Saturday. The pt reported that she has had a poor appetite and weight loss ever 
since she started chemo. Per EMR the pt was 136 lbs in Dec 2018, suggesting a 6% weight 
loss. She stated her UBW use to be 150 lbs. She appeared tired and fatigued. She reported 
N/V, denied C/D/chewing or swallowing issues as well as any food allergies. Pt was open to 
receiving Ensure supplement again during this admission, reported this to nurse. Pt had no 
other questions or concerns. Will continue to monitor. 



Principal Problems/Diagnoses: AMS, weakness 



PMH:Breast cancer 



GI: nothing recorded within EMR yet 



Skin:no pressure ulcer recorded in EMR 



Labs: 

2/12: HDL cholesterol: 18



Meds: abx, zofran. Mag ox, Lipitor

IVF: NS at 75 ml/hr 



Ht: 62 in 

Wt: 128 lbs 

BMI: 23.4 kg/m^2

IBW:110 lbs 



Malnutrition Evaluation (2/12/20)

The patient meets criteria for MILD protein-calorie malnutrition. 

The patient meets criteria for MODERATE protein-calorie malnutrition. 

The patient meets criteria for unspecified SEVERE protein-calorie malnutrition. 



Energy intake:

<75% of estimated energy requirements for >3 months



Weight loss:

Pt has had a  6% weight loss. 

Fat loss: Mild, 

Muscle loss: Mild







Nutrition Prescription (Diet Order): cardiac 





Estimated Nutritional Needs:

Calories: 1450-2030kcal/day (25-35  kcal/kg/day) Weight used : CBW (58 kg)

Protein : 58-87protein/day (1-1.5 gram/kg/day ) Weight used: CBW(58 kg)



Diet Adequacy:

Not meeting calorie needs, Not meeting protein needs)





Diet Education Needs Assessment:

Diet education not indicated. 



Nutrition Care Level: mod 



Nutrition Diagnosis: chronic malnutrition related to medical condition ( Breast Cancer) as 
evidenced by the pt reporting poor appetite and having weight loss since the start of her 
chemotherapy. 





Goal: Patient will meet % of estimated needs by follow up 

Progress:  N/A



Interventions:

-(General healthful diet, , Commercial beverage, IVF, Prescription medications, 
Collaboration with other providers, 



Monitoring/Evaluation:

-Total energy intake, Total protein intake, , IVF, Prescription medication, , Liquid 
supplement, Weight change, 





Signed: Mica Guthrie RD, LD

## 2020-02-13 VITALS — DIASTOLIC BLOOD PRESSURE: 79 MMHG | SYSTOLIC BLOOD PRESSURE: 129 MMHG

## 2020-02-13 VITALS — DIASTOLIC BLOOD PRESSURE: 60 MMHG | SYSTOLIC BLOOD PRESSURE: 102 MMHG

## 2020-02-13 VITALS — SYSTOLIC BLOOD PRESSURE: 135 MMHG | DIASTOLIC BLOOD PRESSURE: 66 MMHG

## 2020-02-13 VITALS — DIASTOLIC BLOOD PRESSURE: 63 MMHG | SYSTOLIC BLOOD PRESSURE: 134 MMHG

## 2020-02-13 VITALS — SYSTOLIC BLOOD PRESSURE: 102 MMHG | DIASTOLIC BLOOD PRESSURE: 60 MMHG

## 2020-02-13 VITALS — DIASTOLIC BLOOD PRESSURE: 77 MMHG | SYSTOLIC BLOOD PRESSURE: 155 MMHG

## 2020-02-13 VITALS — DIASTOLIC BLOOD PRESSURE: 78 MMHG | SYSTOLIC BLOOD PRESSURE: 139 MMHG

## 2020-02-13 LAB
ANION GAP SERPL CALC-SCNC: 11.5 MMOL/L (ref 8–16)
BASOPHILS # BLD AUTO: 0 10*3/UL (ref 0–0.1)
BASOPHILS NFR BLD AUTO: 0.4 % (ref 0–1)
BUN SERPL-MCNC: 7 MG/DL (ref 7–26)
BUN/CREAT SERPL: 12 (ref 6–25)
CALCIUM SERPL-MCNC: 8.8 MG/DL (ref 8.4–10.2)
CHLORIDE SERPL-SCNC: 102 MMOL/L (ref 98–107)
CO2 SERPL-SCNC: 26 MMOL/L (ref 22–29)
DEPRECATED INR PLAS: 0.99
DEPRECATED NEUTROPHILS # BLD AUTO: 4.4 10*3/UL (ref 2.1–6.9)
DEPRECATED PHOSPHATE SERPL-MCNC: 2.5 MG/DL (ref 2.3–4.7)
EGFRCR SERPLBLD CKD-EPI 2021: > 60 ML/MIN (ref 60–?)
EOSINOPHIL # BLD AUTO: 0 10*3/UL (ref 0–0.4)
EOSINOPHIL NFR BLD AUTO: 0 % (ref 0–6)
ERYTHROCYTE [DISTWIDTH] IN CORD BLOOD: 14.5 % (ref 11.7–14.4)
GLUCOSE SERPLBLD-MCNC: 96 MG/DL (ref 74–118)
HCT VFR BLD AUTO: 26.7 % (ref 34.2–44.1)
HGB BLD-MCNC: 8.8 G/DL (ref 12–16)
LYMPHOCYTES # BLD: 0.4 10*3/UL (ref 1–3.2)
LYMPHOCYTES NFR BLD AUTO: 6.8 % (ref 18–39.1)
MAGNESIUM SERPL-MCNC: 1.6 MG/DL (ref 1.3–2.1)
MCH RBC QN AUTO: 29.8 PG (ref 28–32)
MCHC RBC AUTO-ENTMCNC: 33 G/DL (ref 31–35)
MCV RBC AUTO: 90.5 FL (ref 81–99)
MONOCYTES # BLD AUTO: 0.6 10*3/UL (ref 0.2–0.8)
MONOCYTES NFR BLD AUTO: 10.8 % (ref 4.4–11.3)
NEUTS SEG NFR BLD AUTO: 80.7 % (ref 38.7–80)
PLATELET # BLD AUTO: 224 X10E3/UL (ref 140–360)
POTASSIUM SERPL-SCNC: 3.5 MMOL/L (ref 3.5–5.1)
PROTHROMBIN TIME: 13.7 SECONDS (ref 11.9–14.5)
RBC # BLD AUTO: 2.95 X10E6/UL (ref 3.6–5.1)
SODIUM SERPL-SCNC: 136 MMOL/L (ref 136–145)

## 2020-02-13 RX ADMIN — MEROPENEM SCH MLS/HR: 1 INJECTION INTRAVENOUS at 11:35

## 2020-02-13 RX ADMIN — Medication SCH MLS/HR: at 20:30

## 2020-02-13 RX ADMIN — SODIUM CHLORIDE PRN MG: 900 INJECTION INTRAVENOUS at 21:20

## 2020-02-13 RX ADMIN — MEROPENEM SCH MLS/HR: 1 INJECTION INTRAVENOUS at 21:15

## 2020-02-13 RX ADMIN — VANCOMYCIN HYDROCHLORIDE SCH MG: 250 CAPSULE ORAL at 05:50

## 2020-02-13 RX ADMIN — CEFTRIAXONE SCH MLS/HR: 100 INJECTION, POWDER, FOR SOLUTION INTRAVENOUS at 05:51

## 2020-02-13 RX ADMIN — WARFARIN SODIUM SCH MG: 3 TABLET ORAL at 17:50

## 2020-02-13 RX ADMIN — ATORVASTATIN CALCIUM SCH MG: 20 TABLET, FILM COATED ORAL at 21:15

## 2020-02-13 RX ADMIN — METOPROLOL TARTRATE SCH MG: 25 TABLET, FILM COATED ORAL at 09:31

## 2020-02-13 RX ADMIN — Medication SCH MG: at 09:31

## 2020-02-13 RX ADMIN — Medication PRN MG: at 01:17

## 2020-02-13 RX ADMIN — VANCOMYCIN HYDROCHLORIDE SCH MG: 250 CAPSULE ORAL at 21:15

## 2020-02-13 RX ADMIN — LATANOPROST SCH ML: 50 SOLUTION OPHTHALMIC at 21:00

## 2020-02-13 RX ADMIN — VANCOMYCIN HYDROCHLORIDE SCH MG: 250 CAPSULE ORAL at 16:32

## 2020-02-13 RX ADMIN — METOPROLOL TARTRATE SCH MG: 25 TABLET, FILM COATED ORAL at 21:00

## 2020-02-13 RX ADMIN — Medication SCH MG: at 17:51

## 2020-02-13 RX ADMIN — Medication PRN MG: at 15:28

## 2020-02-13 NOTE — NUR
Received patient lying in bed with eyes open. Respiration even and unlabored without SOB. 
Heparin drip currently infusing on right AC 20 gauge. Call light in reach.

## 2020-02-13 NOTE — NUR
PATIENT RESTING COMFORTABLY IN BED IN STABLE CONDITION, NO SIGNS OF DISTRESS NOTED. NASAL 
CANNULA INTACT AND RUNNING AT 2 LITERS AND PATIENT VOICES NO PAIN AT THIS TIME. FAMILY 
MEMBERS ARE AT BEDSIDE AND PATIENT IS AWAITING TRANSFER TO MED SURG 2. HEPARIN DRIP IS 
RUNNING AT ORDERED RATE, BED IS IN LOWEST POSITION, SIDE RAILS ARE UP, CALL LIGHT WITHIN 
EASY REACH, WILL CONTINUE TO MONITOR.

## 2020-02-13 NOTE — NUR
Pt received as transfer from room 101 (MS 1 unit) to room 215 (MS 2 unit). Pt alert and 
oriented x3. Pt has generalized weakness especially on bilateral lower extremities. Pt 
receiving heparin drip (4.5ml/hr) for A-fib protocol. Pt diapered. Bed alarm active. Call 
bell within reach.

## 2020-02-13 NOTE — PROGRESS NOTE
DATE:  02/13/2020

 

Cardiology Progress Note 

 

SUBJECTIVE:  No complaints.

 

OBJECTIVE:  VITAL SIGNS:  Temperature 98.5, heart rate 72, blood pressure 139/78,

respiratory rate 18, and O2 saturation 98%. 

GENERAL:  In no acute distress.  Alert. 

NECK:  No JVD. 

CHEST:  Clear to auscultation. 

CARDIOVASCULAR:  Regular rate and rhythm.  Normal S1 and S2.  Systolic ejection murmur

1/6.  No S3.  No S4. 

ABDOMEN:  Soft.  Bowel sounds positive. 

EXTREMITIES:  No edema.  Warm extremities.

 

CARDIOVASCULAR MEDICATIONS:  Reviewed.  Atorvastatin 20 mg at bedtime, metoprolol

tartrate 25 mg every 12 hours, heparin IV, warfarin 3 mg daily, meropenem and vancomycin

antibiotics. 

 

STUDIES:  Reviewed.  Sodium 136, potassium 3.5, chloride 102, bicarbonate 26, BUN 7,

creatinine 0.57, and glucose 96.  White blood cells 5.4, hemoglobin 8.8, and platelets

224.  INR 0.9, PT 13.7, and .  AST 23, ALT 14, alkaline phosphatase 71, and total

bilirubin 0.7. 

 

ASSESSMENT AND PLAN:  

1. An 79-year-old woman with paroxysmal atrial fibrillation, presenting with acute

cerebrovascular accident, right PCA territory ischemic. 

2. Preserved left ventricular systolic function.

3. Mild aortic insufficiency.

4. Anemia.

5. History of breast cancer, status post bilateral mastectomy with recurrence for which

chemotherapy is being coordinated with Hem/Onc. 

 

RECOMMEND:  

1. Continue warfarin for target INR 2 to 3.

2. Heparin bridge.

3. Fecal occult blood test ordered and pending.  The patient has no bowel movement so

far.  Discussed with nurse. 

4. Continue statin and beta-blocker for rate control.

5. Outpatient stress test at a later date.

 

 

 

 

______________________________

Jaylen Parnell MD

 

AFV/MODL

D:  02/13/2020 16:09:48

T:  02/13/2020 18:37:23

Job #:  [id number]

## 2020-02-14 VITALS — SYSTOLIC BLOOD PRESSURE: 139 MMHG | DIASTOLIC BLOOD PRESSURE: 84 MMHG

## 2020-02-14 VITALS — DIASTOLIC BLOOD PRESSURE: 64 MMHG | SYSTOLIC BLOOD PRESSURE: 97 MMHG

## 2020-02-14 VITALS — DIASTOLIC BLOOD PRESSURE: 65 MMHG | SYSTOLIC BLOOD PRESSURE: 101 MMHG

## 2020-02-14 VITALS — DIASTOLIC BLOOD PRESSURE: 80 MMHG | SYSTOLIC BLOOD PRESSURE: 187 MMHG

## 2020-02-14 VITALS — SYSTOLIC BLOOD PRESSURE: 117 MMHG | DIASTOLIC BLOOD PRESSURE: 70 MMHG

## 2020-02-14 VITALS — DIASTOLIC BLOOD PRESSURE: 57 MMHG | SYSTOLIC BLOOD PRESSURE: 95 MMHG

## 2020-02-14 LAB
DEPRECATED INR PLAS: 1.07
PROTHROMBIN TIME: 14.6 SECONDS (ref 11.9–14.5)

## 2020-02-14 RX ADMIN — Medication PRN MG: at 00:24

## 2020-02-14 RX ADMIN — SODIUM CHLORIDE PRN MG: 900 INJECTION INTRAVENOUS at 20:44

## 2020-02-14 RX ADMIN — METOPROLOL TARTRATE SCH MG: 25 TABLET, FILM COATED ORAL at 21:35

## 2020-02-14 RX ADMIN — VANCOMYCIN HYDROCHLORIDE SCH MG: 250 CAPSULE ORAL at 21:35

## 2020-02-14 RX ADMIN — Medication SCH MG: at 15:25

## 2020-02-14 RX ADMIN — Medication SCH MLS/HR: at 21:30

## 2020-02-14 RX ADMIN — METOPROLOL TARTRATE SCH MG: 25 TABLET, FILM COATED ORAL at 09:00

## 2020-02-14 RX ADMIN — Medication SCH MG: at 09:27

## 2020-02-14 RX ADMIN — Medication SCH MG: at 18:06

## 2020-02-14 RX ADMIN — WARFARIN SODIUM SCH MG: 3 TABLET ORAL at 18:06

## 2020-02-14 RX ADMIN — MEROPENEM SCH MLS/HR: 1 INJECTION INTRAVENOUS at 21:34

## 2020-02-14 RX ADMIN — Medication SCH MG: at 21:35

## 2020-02-14 RX ADMIN — VANCOMYCIN HYDROCHLORIDE SCH MG: 250 CAPSULE ORAL at 04:54

## 2020-02-14 RX ADMIN — MEROPENEM SCH MLS/HR: 1 INJECTION INTRAVENOUS at 09:27

## 2020-02-14 RX ADMIN — VANCOMYCIN HYDROCHLORIDE SCH MG: 250 CAPSULE ORAL at 14:04

## 2020-02-14 RX ADMIN — LATANOPROST SCH DROP: 50 SOLUTION OPHTHALMIC at 21:45

## 2020-02-14 RX ADMIN — Medication PRN MG: at 09:27

## 2020-02-14 RX ADMIN — ATORVASTATIN CALCIUM SCH MG: 20 TABLET, FILM COATED ORAL at 21:34

## 2020-02-14 NOTE — NUR
PTT 49.3. Increased rate of heparin drip by 100 units/hr. Current rate of 450 units/hr 
(4.5ml/hr) increased to new rate of 550 units/hr or 5.5 ml/hr.

## 2020-02-14 NOTE — PROGRESS NOTE
DATE:  02/14/2020

 

Cardiology Progress Note 

 

SUBJECTIVE:  Denies any chest pain, shortness of breath, lightheadedness, or other

complaints this morning except for nausea and decreased p.o. appetite. 

 

OBJECTIVE:  VITAL SIGNS:  Reviewed.  Temperature 101.4, heart rate 113, blood pressure

117/70, respiratory rate 16, and O2 saturation 97%. 

GENERAL:  No acute distress.  Alert. 

NECK:  No JVD. 

CHEST:  Clear to auscultation. 

CARDIOVASCULAR:  Regular rate and rhythm.  Normal S1 and S2. 

ABDOMEN:  Soft.  Bowel sounds positive. 

EXTREMITIES:  With trace edema.

 

CARDIOVASCULAR MEDICATIONS:  Reviewed.  Atorvastatin 200 mg at bedtime, metoprolol

tartrate 25 mg every 12 hours, warfarin 3 mg daily, meropenem, and vancomycin IV. 

 

STUDIES:  Reviewed.  Sodium 136, potassium 3.5, chloride 102, bicarbonate 26, BUN 7,

creatinine 0.57, and glucose 96.  White blood cells 5.4, hemoglobin 8.8, and platelets

224.  INR 1.07, PT 14.6, and PTT 49.3.  AST 23, ALT 14, alkaline phosphatase 71, and

total bilirubin 0.7. 

 

ASSESSMENT AND PLAN:  An 79-year-old woman with paroxysmal atrial fibrillation, new

diagnosis, cerebrovascular accident, right posterior cerebral artery territory, fever of

unclear etiology noted today, nausea and decreased p.o. appetite with anemia, pending

anemia workup. 

 

RECOMMEND:  Continue current cardiovascular medications.  Monitor INR.  Monitor changes

in H and H or signs of any acute bleeding.  Consider fever workup. 

 

 

 

 

______________________________

MD JOSE J Tejeda/EV

D:  02/14/2020 09:47:46

T:  02/14/2020 12:11:14

Job #:  [id number]

## 2020-02-14 NOTE — NUR
PTT 90.9. Decrease rate of heparin drip by 100 units/hr per protocol. Current rate of 550 
units/hr (5.5ml/hr), decrease to new rate of 450 units/hr or 4.5 ml/hr. recheck PTT in 6 hrs

## 2020-02-14 NOTE — NUR
Notified Dr. Barney that patient's family said she has not had a BM in about a week. I also 
notified him of ortho static VS during PT session. Manual BP 84/54. New orders received.

## 2020-02-15 VITALS — SYSTOLIC BLOOD PRESSURE: 106 MMHG | DIASTOLIC BLOOD PRESSURE: 62 MMHG

## 2020-02-15 VITALS — SYSTOLIC BLOOD PRESSURE: 159 MMHG | DIASTOLIC BLOOD PRESSURE: 74 MMHG

## 2020-02-15 VITALS — SYSTOLIC BLOOD PRESSURE: 95 MMHG | DIASTOLIC BLOOD PRESSURE: 50 MMHG

## 2020-02-15 VITALS — SYSTOLIC BLOOD PRESSURE: 97 MMHG | DIASTOLIC BLOOD PRESSURE: 63 MMHG

## 2020-02-15 VITALS — DIASTOLIC BLOOD PRESSURE: 48 MMHG | SYSTOLIC BLOOD PRESSURE: 102 MMHG

## 2020-02-15 VITALS — DIASTOLIC BLOOD PRESSURE: 69 MMHG | SYSTOLIC BLOOD PRESSURE: 157 MMHG

## 2020-02-15 VITALS — DIASTOLIC BLOOD PRESSURE: 49 MMHG | SYSTOLIC BLOOD PRESSURE: 100 MMHG

## 2020-02-15 VITALS — DIASTOLIC BLOOD PRESSURE: 63 MMHG | SYSTOLIC BLOOD PRESSURE: 135 MMHG

## 2020-02-15 VITALS — SYSTOLIC BLOOD PRESSURE: 87 MMHG | DIASTOLIC BLOOD PRESSURE: 51 MMHG

## 2020-02-15 VITALS — SYSTOLIC BLOOD PRESSURE: 158 MMHG | DIASTOLIC BLOOD PRESSURE: 66 MMHG

## 2020-02-15 VITALS — DIASTOLIC BLOOD PRESSURE: 58 MMHG | SYSTOLIC BLOOD PRESSURE: 81 MMHG

## 2020-02-15 LAB
DEPRECATED APTT PLAS QN: 71 SECONDS (ref 23.8–35.5)
DEPRECATED INR PLAS: 1.73
PROTHROMBIN TIME: 21.5 SECONDS (ref 11.9–14.5)

## 2020-02-15 RX ADMIN — Medication SCH MG: at 10:01

## 2020-02-15 RX ADMIN — Medication SCH MG: at 19:05

## 2020-02-15 RX ADMIN — Medication SCH MG: at 20:57

## 2020-02-15 RX ADMIN — METOPROLOL TARTRATE SCH MG: 25 TABLET, FILM COATED ORAL at 09:00

## 2020-02-15 RX ADMIN — Medication SCH MLS/HR: at 20:36

## 2020-02-15 RX ADMIN — WARFARIN SODIUM SCH MG: 3 TABLET ORAL at 19:05

## 2020-02-15 RX ADMIN — LATANOPROST SCH DROP: 50 SOLUTION OPHTHALMIC at 21:28

## 2020-02-15 RX ADMIN — MEROPENEM SCH MLS/HR: 1 INJECTION INTRAVENOUS at 20:54

## 2020-02-15 RX ADMIN — SODIUM CHLORIDE SCH MLS/HR: 9 INJECTION, SOLUTION INTRAVENOUS at 13:15

## 2020-02-15 RX ADMIN — METOPROLOL TARTRATE SCH MG: 25 TABLET, FILM COATED ORAL at 20:57

## 2020-02-15 RX ADMIN — MEROPENEM SCH MLS/HR: 1 INJECTION INTRAVENOUS at 10:01

## 2020-02-15 RX ADMIN — METOCLOPRAMIDE SCH MG: 10 TABLET ORAL at 20:56

## 2020-02-15 RX ADMIN — METOCLOPRAMIDE SCH MG: 10 TABLET ORAL at 16:30

## 2020-02-15 RX ADMIN — ATORVASTATIN CALCIUM SCH MG: 20 TABLET, FILM COATED ORAL at 20:55

## 2020-02-15 RX ADMIN — DEXTROSE MONOHYDRATE SCH MLS/HR: 5 INJECTION INTRAVENOUS at 13:37

## 2020-02-15 RX ADMIN — VANCOMYCIN HYDROCHLORIDE SCH MG: 250 CAPSULE ORAL at 05:30

## 2020-02-15 NOTE — NUR
ptt result came back 71, heparin drip rate is not changed, its on 54957 units/hr at this 
time. 

-------------------------------------------------------------------------------

Addendum: 02/15/20 at 2038 by Katelin Neely RN

-------------------------------------------------------------------------------

2.5 ml/hr

## 2020-02-15 NOTE — NUR
CALL FROM TELE. PT HEART RATE 180S HOLDING WITH RVR TO SINUS RHYTHM. CHECKING ON PT NO C/O 
PAIN NO SOB, PT STATES SHE FEELS OK, PT HAS FAMILY MEMBERS AT BEDSIDE. NO DISTRESS NOTED.

## 2020-02-15 NOTE — PROGRESS NOTE
DATE:  02/15/2020

 

Cardiology Progress Note 

 

SUBJECTIVE:  No complaints.

 

OBJECTIVE:  VITAL SIGNS:  Temperature 96.1, heart rate 108, blood pressure 95/50,

respiratory rate 18, and O2 saturation 95%. 

GENERAL:  In no acute distress, alert. 

NECK:  No JVD. 

CHEST:  Clear to auscultation. 

CARDIOVASCULAR:  Regular rate and rhythm.  Normal S1, S2. 

ABDOMEN:  Soft.  Bowel sounds positive. 

EXTREMITIES:  No edema.

 

CARDIOVASCULAR MEDICATIONS:  Reviewed, atorvastatin 20 mg at bedtime, metoprolol

tartrate 25 mg every 12 hours, warfarin 3 mg daily, meropenem, and vancomycin. 

 

LABORATORY DATA:  Reviewed.  Sodium 136, potassium 3.5, chloride 102, bicarbonate 26,

BUN 7, creatinine 0.57, and glucose 96.  White blood cells 5.4, hemoglobin 8.8, and

platelets 224.  PT 14.6, PTT 85.6, and INR 1.07.  AST 23, ALT 14, total bilirubin 0.7,

and alkaline phosphatase 71. 

 

ASSESSMENT:  An 79-year-old woman presents with:

1. Cerebrovascular accident, acute.

2. Atrial fibrillation.

3. Hypertension.

4. Aortic valve disorder.

 

RECOMMENDATIONS:  

1. Continue current cardiovascular medications.

2. Undergoing treatment for breast cancer per Heme-Onc.

3. Continue beta-blocker and statin.

 

 

 

 

______________________________

Jaylen Parnell MD

 

AFJAY/EV

D:  02/15/2020 08:07:28

T:  02/15/2020 09:56:20

Job #:  [id number]

## 2020-02-15 NOTE — NUR
Pt transferred to intensive care.  PT will hold therapy and will require new orders from MD 
to continue treatment.  

-------------------------------------------------------------------------------

Addendum: 02/15/20 at 1944 by Raquel Solorio PT

-------------------------------------------------------------------------------

Amended: Links added.

## 2020-02-15 NOTE — NUR
PTT 85.6, Decrease rate of heparin drip by 100 units/hr per protocol. Current rate of 450 
units/hr (4.5ml/hr), decrease to new rate of 350 units/hr or 3.5 ml/hr. recheck PTT in 6 hrs

## 2020-02-15 NOTE — NUR
Patient aware    patient just had moderate soft bowel movement. changed patient in bed,tolerated well, will 
continue to monitor.

## 2020-02-16 VITALS — DIASTOLIC BLOOD PRESSURE: 64 MMHG | SYSTOLIC BLOOD PRESSURE: 115 MMHG

## 2020-02-16 VITALS — SYSTOLIC BLOOD PRESSURE: 109 MMHG | DIASTOLIC BLOOD PRESSURE: 52 MMHG

## 2020-02-16 VITALS — DIASTOLIC BLOOD PRESSURE: 64 MMHG | SYSTOLIC BLOOD PRESSURE: 120 MMHG

## 2020-02-16 VITALS — SYSTOLIC BLOOD PRESSURE: 117 MMHG | DIASTOLIC BLOOD PRESSURE: 59 MMHG

## 2020-02-16 VITALS — SYSTOLIC BLOOD PRESSURE: 113 MMHG | DIASTOLIC BLOOD PRESSURE: 77 MMHG

## 2020-02-16 VITALS — SYSTOLIC BLOOD PRESSURE: 98 MMHG | DIASTOLIC BLOOD PRESSURE: 53 MMHG

## 2020-02-16 VITALS — DIASTOLIC BLOOD PRESSURE: 61 MMHG | SYSTOLIC BLOOD PRESSURE: 116 MMHG

## 2020-02-16 VITALS — DIASTOLIC BLOOD PRESSURE: 53 MMHG | SYSTOLIC BLOOD PRESSURE: 98 MMHG

## 2020-02-16 LAB
ANION GAP SERPL CALC-SCNC: 12.8 MMOL/L (ref 8–16)
BASOPHILS # BLD AUTO: 0 10*3/UL (ref 0–0.1)
BASOPHILS NFR BLD AUTO: 0.4 % (ref 0–1)
BUN SERPL-MCNC: 10 MG/DL (ref 7–26)
BUN/CREAT SERPL: 17 (ref 6–25)
CALCIUM SERPL-MCNC: 8.5 MG/DL (ref 8.4–10.2)
CHLORIDE SERPL-SCNC: 98 MMOL/L (ref 98–107)
CO2 SERPL-SCNC: 29 MMOL/L (ref 22–29)
DEPRECATED INR PLAS: 2.27
DEPRECATED INR PLAS: 2.83
DEPRECATED NEUTROPHILS # BLD AUTO: 3.7 10*3/UL (ref 2.1–6.9)
EGFRCR SERPLBLD CKD-EPI 2021: > 60 ML/MIN (ref 60–?)
EOSINOPHIL # BLD AUTO: 0 10*3/UL (ref 0–0.4)
EOSINOPHIL NFR BLD AUTO: 0 % (ref 0–6)
ERYTHROCYTE [DISTWIDTH] IN CORD BLOOD: 14.6 % (ref 11.7–14.4)
GLUCOSE SERPLBLD-MCNC: 109 MG/DL (ref 74–118)
HCT VFR BLD AUTO: 26.5 % (ref 34.2–44.1)
HGB BLD-MCNC: 8.6 G/DL (ref 12–16)
LYMPHOCYTES # BLD: 0.4 10*3/UL (ref 1–3.2)
LYMPHOCYTES NFR BLD AUTO: 7.4 % (ref 18–39.1)
MCH RBC QN AUTO: 29.5 PG (ref 28–32)
MCHC RBC AUTO-ENTMCNC: 32.5 G/DL (ref 31–35)
MCV RBC AUTO: 90.8 FL (ref 81–99)
MONOCYTES # BLD AUTO: 0.6 10*3/UL (ref 0.2–0.8)
MONOCYTES NFR BLD AUTO: 12.9 % (ref 4.4–11.3)
NEUTS SEG NFR BLD AUTO: 78.7 % (ref 38.7–80)
PLATELET # BLD AUTO: 227 X10E3/UL (ref 140–360)
POTASSIUM SERPL-SCNC: 3.8 MMOL/L (ref 3.5–5.1)
PROTHROMBIN TIME: 26.7 SECONDS (ref 11.9–14.5)
PROTHROMBIN TIME: 31.9 SECONDS (ref 11.9–14.5)
RBC # BLD AUTO: 2.92 X10E6/UL (ref 3.6–5.1)
SODIUM SERPL-SCNC: 136 MMOL/L (ref 136–145)

## 2020-02-16 RX ADMIN — Medication SCH MG: at 09:09

## 2020-02-16 RX ADMIN — DEXTROSE MONOHYDRATE SCH MLS/HR: 5 INJECTION INTRAVENOUS at 04:42

## 2020-02-16 RX ADMIN — SODIUM CHLORIDE SCH MLS/HR: 9 INJECTION, SOLUTION INTRAVENOUS at 10:48

## 2020-02-16 RX ADMIN — MEROPENEM SCH MLS/HR: 1 INJECTION INTRAVENOUS at 21:37

## 2020-02-16 RX ADMIN — MEROPENEM SCH MLS/HR: 1 INJECTION INTRAVENOUS at 09:08

## 2020-02-16 RX ADMIN — METOPROLOL TARTRATE SCH MG: 25 TABLET, FILM COATED ORAL at 21:38

## 2020-02-16 RX ADMIN — METOCLOPRAMIDE SCH MG: 10 TABLET ORAL at 21:38

## 2020-02-16 RX ADMIN — AMIODARONE HYDROCHLORIDE SCH MG: 200 TABLET ORAL at 16:56

## 2020-02-16 RX ADMIN — LATANOPROST SCH DROP: 50 SOLUTION OPHTHALMIC at 21:45

## 2020-02-16 RX ADMIN — VANCOMYCIN HYDROCHLORIDE SCH MG: 250 CAPSULE ORAL at 12:13

## 2020-02-16 RX ADMIN — METOPROLOL TARTRATE SCH MG: 25 TABLET, FILM COATED ORAL at 09:00

## 2020-02-16 RX ADMIN — Medication SCH MG: at 16:57

## 2020-02-16 RX ADMIN — METOCLOPRAMIDE SCH MG: 10 TABLET ORAL at 11:31

## 2020-02-16 RX ADMIN — METOCLOPRAMIDE SCH MG: 10 TABLET ORAL at 16:31

## 2020-02-16 RX ADMIN — SODIUM CHLORIDE SCH MLS/HR: 9 INJECTION, SOLUTION INTRAVENOUS at 23:08

## 2020-02-16 RX ADMIN — SODIUM CHLORIDE SCH MLS/HR: 9 INJECTION, SOLUTION INTRAVENOUS at 03:56

## 2020-02-16 RX ADMIN — METOCLOPRAMIDE SCH MG: 10 TABLET ORAL at 07:46

## 2020-02-16 RX ADMIN — Medication SCH MG: at 21:38

## 2020-02-16 RX ADMIN — ATORVASTATIN CALCIUM SCH MG: 20 TABLET, FILM COATED ORAL at 21:37

## 2020-02-16 RX ADMIN — VANCOMYCIN HYDROCHLORIDE SCH MG: 250 CAPSULE ORAL at 18:01

## 2020-02-16 NOTE — PROGRESS NOTE
DATE:  02/16/2020

 

Cardiology Progress Note 

 

SUBJECTIVE:  No complaints.

 

OBJECTIVE:  VITAL SIGNS:  Temperature 98.7, heart rate 88, blood pressure 120/64,

respiratory rate is 19, O2 saturation 99%. 

GENERAL:  In no acute distress.  Alert. 

NECK:  No JVD. 

CHEST:  Clear to auscultation. 

CARDIOVASCULAR:  Regular rate and rhythm.  Normal S1 and S2. 

ABDOMEN:  Soft.  Bowel sounds positive. 

EXTREMITIES:  No edema.

 

CARDIOVASCULAR MEDICATIONS:  Reviewed, on amiodarone drip being completed today,

metoprolol tartrate 25 mg every 12 hours, atorvastatin 20 mg at bedtime, heparin IV drip

being transitioned out as INR therapeutic with warfarin 2 mg daily. 

 

LABORATORY STUDIES:  Reviewed sodium 136, potassium 3.8, chloride 98, bicarbonate 29,

BUN 10, creatinine 0.8, glucose 109, white blood cells 4.7, hemoglobin 8.6, platelets

227, INR 2.27, AST 23, ALT 14. 

 

ASSESSMENT AND PLAN:  

1. An 79-year-old woman presents with atrial fibrillation.

2. Hypertension and dyslipidemia.

3. Anemia.

4. Stroke.

5. Aortic valve disorder.

6. Recommend continue current cardiovascular medications and transition warfarin to 400

mg b.i.d. while in-house and further decrease in dose for the following week with

further tapering down as outpatient. 

 

 

 

 

______________________________

Jaylen Parnell MD

 

AFJAY/MODL

D:  02/16/2020 19:16:43

T:  02/16/2020 22:10:44

Job #:  [id number]

## 2020-02-16 NOTE — NUR
Message left for the rehab manager at [facility] as the contact number for Dr. PEGGY Lindsay and 
rehab consult.

## 2020-02-16 NOTE — NUR
patient is awaken, no distress noted, another bowel movement noted, cleaned pt in bed, 
tolerated well. denied any discomfort.

## 2020-02-17 VITALS — SYSTOLIC BLOOD PRESSURE: 105 MMHG | DIASTOLIC BLOOD PRESSURE: 62 MMHG

## 2020-02-17 VITALS — DIASTOLIC BLOOD PRESSURE: 70 MMHG | SYSTOLIC BLOOD PRESSURE: 138 MMHG

## 2020-02-17 VITALS — DIASTOLIC BLOOD PRESSURE: 62 MMHG | SYSTOLIC BLOOD PRESSURE: 105 MMHG

## 2020-02-17 VITALS — SYSTOLIC BLOOD PRESSURE: 122 MMHG | DIASTOLIC BLOOD PRESSURE: 74 MMHG

## 2020-02-17 VITALS — DIASTOLIC BLOOD PRESSURE: 62 MMHG | SYSTOLIC BLOOD PRESSURE: 111 MMHG

## 2020-02-17 VITALS — SYSTOLIC BLOOD PRESSURE: 103 MMHG | DIASTOLIC BLOOD PRESSURE: 61 MMHG

## 2020-02-17 LAB
DEPRECATED INR PLAS: 3.47
PROTHROMBIN TIME: 37.6 SECONDS (ref 11.9–14.5)

## 2020-02-17 RX ADMIN — SODIUM CHLORIDE SCH MLS/HR: 9 INJECTION, SOLUTION INTRAVENOUS at 21:28

## 2020-02-17 RX ADMIN — VANCOMYCIN HYDROCHLORIDE SCH MG: 250 CAPSULE ORAL at 06:10

## 2020-02-17 RX ADMIN — VANCOMYCIN HYDROCHLORIDE SCH MG: 250 CAPSULE ORAL at 23:45

## 2020-02-17 RX ADMIN — VANCOMYCIN HYDROCHLORIDE SCH MG: 250 CAPSULE ORAL at 13:00

## 2020-02-17 RX ADMIN — Medication SCH MG: at 08:13

## 2020-02-17 RX ADMIN — METOCLOPRAMIDE SCH MG: 10 TABLET ORAL at 21:28

## 2020-02-17 RX ADMIN — MEROPENEM SCH MLS/HR: 1 INJECTION INTRAVENOUS at 21:28

## 2020-02-17 RX ADMIN — LATANOPROST SCH DROP: 50 SOLUTION OPHTHALMIC at 21:28

## 2020-02-17 RX ADMIN — MEROPENEM SCH MLS/HR: 1 INJECTION INTRAVENOUS at 08:13

## 2020-02-17 RX ADMIN — AMIODARONE HYDROCHLORIDE SCH MG: 200 TABLET ORAL at 17:00

## 2020-02-17 RX ADMIN — SODIUM CHLORIDE PRN MG: 900 INJECTION INTRAVENOUS at 07:03

## 2020-02-17 RX ADMIN — VANCOMYCIN HYDROCHLORIDE SCH MG: 250 CAPSULE ORAL at 18:00

## 2020-02-17 RX ADMIN — METOPROLOL TARTRATE SCH MG: 25 TABLET, FILM COATED ORAL at 09:00

## 2020-02-17 RX ADMIN — Medication SCH MG: at 21:28

## 2020-02-17 RX ADMIN — SODIUM CHLORIDE SCH MLS/HR: 9 INJECTION, SOLUTION INTRAVENOUS at 08:13

## 2020-02-17 RX ADMIN — METOCLOPRAMIDE SCH MG: 10 TABLET ORAL at 16:30

## 2020-02-17 RX ADMIN — Medication SCH MG: at 17:00

## 2020-02-17 RX ADMIN — METOCLOPRAMIDE SCH MG: 10 TABLET ORAL at 11:05

## 2020-02-17 RX ADMIN — AMIODARONE HYDROCHLORIDE SCH MG: 200 TABLET ORAL at 08:13

## 2020-02-17 RX ADMIN — VANCOMYCIN HYDROCHLORIDE SCH MG: 250 CAPSULE ORAL at 00:19

## 2020-02-17 RX ADMIN — ATORVASTATIN CALCIUM SCH MG: 20 TABLET, FILM COATED ORAL at 21:28

## 2020-02-17 RX ADMIN — METOPROLOL TARTRATE SCH MG: 25 TABLET, FILM COATED ORAL at 21:28

## 2020-02-17 RX ADMIN — METOCLOPRAMIDE SCH MG: 10 TABLET ORAL at 07:40

## 2020-02-17 NOTE — NUR
MET W THE PT AND DTR AT THE BEDSIDE.  DISCUSSED CHOICE FOR IN REHAB.

STATES THEY WOULD LIKE [facility] RE[facility].  

CHOICE WAS SIGNED AND MOT INITIATED.

## 2020-02-17 NOTE — NUR
Per Dr. Barney and Dr. Lindsay pt ok to resume physical therapy at this time. Dr. Gomez gave 
orders to hold today's 1700 dose of Coumadin due to elevated INR. Pt transferred to medical 
surgical floor with telemetry, room 200. Pt did not appear to be in any distress at the time 
of transfer. Report given to Peyton MCDANIEL. 

-------------------------------------------------------------------------------

Addendum: 02/17/20 at 1414 by Dolores Parikh RN

-------------------------------------------------------------------------------

Foam dressing placed on sacrum.

## 2020-02-17 NOTE — NUR
Nutrition Intervention Note



RD Recommendation(s) for Physician: 

-Continue current diet per MD. 

-Recommend Ensure Enlive TID as tolerated. 

-Encouraged intake as tolerated. 

-Consider appetite stimulant if medically feasible. 

The patient meets criteria for MILD protein-calorie malnutrition. 





Plan of Care: RD following, monitoring for tolerance and adequacy. Ensure Enlive TID.



Nutrition reason for involvement:

Follow up



RD Assessment

2/17: Follow up: Pt has been moved from the floors to the ICU. Discussed pt in rounds, she 
is not eating much. Ensure Enlive is still being delivered on her trays. Possible d/c to 
acute rehab.  Pt has been consuming 2-25% of her meals per meal assessment. Consider 
appetite stimulant if medically feasible. Will continue to monitor. 



2/12: 79 YOF admitted for AMS with PMH listed below. The pt was recently d/c from this 
hospital on Saturday. The pt reported that she has had a poor appetite and weight loss ever 
since she started chemo. Per EMR the pt was 136 lbs in Dec 2018, suggesting a 6% weight 
loss. She stated her UBW use to be 150 lbs. She appeared tired and fatigued. She reported 
N/V, denied C/D/chewing or swallowing issues as well as any food allergies. Pt was open to 
receiving Ensure supplement again during this admission, reported this to nurse. Pt had no 
other questions or concerns. Will continue to monitor. 



Principal Problems/Diagnoses: AMS, weakness 



PMH:Breast cancer 



GI: Abd: flat, soft, LBM: 2/17 



Skin:no pressure ulcer recorded in EMR 



Labs: 

2/17: labs reviewed 

2/12: HDL cholesterol: 18



Meds: abx, zofran. Mag ox, Lipitor, warfarin, senna

IVF: NS at 75 ml/hr 



Ht: 62 in           

Wt: 128 lbs           

BMI: 23.4 kg/m^2     

IBW:     110 lbs 



Malnutrition Evaluation (2/12/20)

The patient meets criteria for MILD protein-calorie malnutrition. 



Energy intake:

<75% of estimated energy requirements for >3 months



Weight loss:

Pt has had a  6% weight loss. 

Fat loss: Mild, 

Muscle loss: Mild







Nutrition Prescription (Diet Order): cardiac 





Estimated Nutritional Needs:

Calories: 4020-5681     kcal/day (25-35  kcal/kg/day) Weight used : CBW (58 kg)

Protein : 58-87     protein/day (1-1.5 gram/kg/day ) Weight used: CBW(58 kg)



Diet Adequacy:

Not meeting calorie needs, Not meeting protein needs





Diet Education Needs Assessment:

Diet education not indicated. 



Nutrition Care Level: mod 



Nutrition Diagnosis: chronic malnutrition related to medical condition ( Breast Cancer) as 
evidenced by the pt reporting poor appetite and having weight loss since the start of her 
chemotherapy. 





Goal: Patient will meet % of estimated needs by follow up 

Progress:  Not progressing 



Interventions:

-(General healthful diet, , Commercial beverage, IVF, Prescription medications, 
Collaboration with other providers, 



Monitoring/Evaluation:

-Total energy intake, Total protein intake, , IVF, Prescription medication, , Liquid 
supplement, Weight change, 





Signed: Mica Guthrie RD, LD

## 2020-02-17 NOTE — PROGRESS NOTE
DATE:  02/17/2020  

 

SUBJECTIVE:  Ms. Squires is seen on rounds today.  She remains in the ICU, had a so-so

day yesterday.  Daughters at the bedside.  No new complaints otherwise. 

 

OBJECTIVE:  HEART:  Regular rate and rhythm. 

LUNGS:  Fair air entry. 

ABDOMEN:  Nondistended. 

NECK:  No JVD.  Weak overall.

 

ASSESSMENT AND PLAN:  

1. To be starting back on therapy to see how she responds.

2. Continue supportive care.

3. Once stable enough and can handle more therapy, we will be transferring to inpatient

rehab. 

 

 

 

 

______________________________

Jake Lindsay DO RPL/MODL

D:  02/17/2020 15:59:00

T:  02/17/2020 18:27:00

Job #:  [id number]

## 2020-02-17 NOTE — NUR
CALL TO [facility] FOR AN UPDATE, BUT NO ANSWER.  

CALL PLACED TO THE DIRECTOR, CYDNEY.  AWAITING CALL BACK.

## 2020-02-17 NOTE — PROGRESS NOTE
DATE:  02/17/2020

 

Cardiology Progress Note 

 

SUBJECTIVE:  No complaints.

 

OBJECTIVE:  VITAL SIGNS:  Temperature 97.5, heart rate 73, blood pressure 103/61,

respiratory rate 21, and O2 saturation 100%. 

GENERAL:  In no acute distress.  Alert. 

NECK:  No JVD. 

CHEST:  Clear to auscultation. 

CARDIOVASCULAR:  Regular rate and rhythm.  Normal S1 and S2.  No S3 or S4.  Systolic

ejection murmur. 

ABDOMEN:  Soft.  Bowel sounds positive. 

EXTREMITIES:  No edema.

 

CARDIOVASCULAR MEDICATIONS:  Reviewed.  Atorvastatin 20 mg at bedtime, warfarin 2 mg

daily, amiodarone 400 mg b.i.d., metoprolol tartrate 25 mg every 12 hours. 

 

LABORATORY DATA:  Reviewed.  Creatinine 0.6, hemoglobin 8.6, and platelets 227.  INR

3.47. 

 

ASSESSMENT AND PLAN:  An 79-year-old woman presents with cerebrovascular accident,

paroxysmal atrial fibrillation, hypertension, and dyslipidemia. 

 

RECOMMEND:  

1. Warfarin target 2 to 3, today supratherapeutic.  Defer anticoagulation adjustment to

Hematology expertise. 

2. Continue statin.

3. Continue amiodarone at 400 mg b.i.d. while in-house with plans to decrease to 200 mg

b.i.d. upon discharge. 

4. Continue rest of cardiovascular medications.

 

 

 

 

______________________________

MD JOSE J Tejeda/EV

D:  02/17/2020 09:23:51

T:  02/17/2020 11:47:31

Job #:  [id number]

## 2020-02-17 NOTE — NUR
RCD PT FROM ICU BY BED PT IS ALERT AND ORIENTED VITALS CHECKED PT RESTING ON BED FAMILY AT 
BED SIDE BED LOW AND LOCKED CALL LIGHT IN REACH

## 2020-02-18 VITALS — DIASTOLIC BLOOD PRESSURE: 75 MMHG | SYSTOLIC BLOOD PRESSURE: 111 MMHG

## 2020-02-18 VITALS — SYSTOLIC BLOOD PRESSURE: 129 MMHG | DIASTOLIC BLOOD PRESSURE: 61 MMHG

## 2020-02-18 VITALS — SYSTOLIC BLOOD PRESSURE: 106 MMHG | DIASTOLIC BLOOD PRESSURE: 56 MMHG

## 2020-02-18 VITALS — DIASTOLIC BLOOD PRESSURE: 77 MMHG | SYSTOLIC BLOOD PRESSURE: 115 MMHG

## 2020-02-18 VITALS — SYSTOLIC BLOOD PRESSURE: 117 MMHG | DIASTOLIC BLOOD PRESSURE: 88 MMHG

## 2020-02-18 LAB
DEPRECATED INR PLAS: 3.53
PROTHROMBIN TIME: 38.2 SECONDS (ref 11.9–14.5)

## 2020-02-18 RX ADMIN — MEROPENEM SCH MLS/HR: 1 INJECTION INTRAVENOUS at 08:31

## 2020-02-18 RX ADMIN — METOPROLOL TARTRATE SCH MG: 25 TABLET, FILM COATED ORAL at 08:31

## 2020-02-18 RX ADMIN — METOCLOPRAMIDE SCH MG: 10 TABLET ORAL at 17:15

## 2020-02-18 RX ADMIN — SODIUM CHLORIDE PRN MG: 900 INJECTION INTRAVENOUS at 03:15

## 2020-02-18 RX ADMIN — AMIODARONE HYDROCHLORIDE SCH MG: 200 TABLET ORAL at 17:15

## 2020-02-18 RX ADMIN — METOCLOPRAMIDE SCH MG: 10 TABLET ORAL at 08:30

## 2020-02-18 RX ADMIN — VANCOMYCIN HYDROCHLORIDE SCH MG: 250 CAPSULE ORAL at 12:30

## 2020-02-18 RX ADMIN — Medication SCH MG: at 09:00

## 2020-02-18 RX ADMIN — VANCOMYCIN HYDROCHLORIDE SCH MG: 250 CAPSULE ORAL at 06:31

## 2020-02-18 RX ADMIN — METOCLOPRAMIDE SCH MG: 10 TABLET ORAL at 12:30

## 2020-02-18 RX ADMIN — AMIODARONE HYDROCHLORIDE SCH MG: 200 TABLET ORAL at 08:31

## 2020-02-18 RX ADMIN — Medication SCH MG: at 08:31

## 2020-02-18 NOTE — NUR
Notified Dr. Barney that patient has a bed at [facility]. Dr Barney stated OK to transfer 
today

## 2020-02-18 NOTE — NUR
House supervisor notified that patient has dc order to transfer to [facility]

## 2020-02-18 NOTE — NUR
ACUTE INPATIENT REHAB DISCHARGE INFORMATION 



PATIENT HAS BEEN ACCEPTED TO: 

[facility]

[address] [place] [address]



ACCEPTING : Hunter Mullins

ACCEPTING MD: Dr. Jake Lindsay

ROOM: [id number]

NURSE CALL REPORT TO: [phone]

Have transport go through the ER entrance 



THE FOLLOWING DOCUMENTS MUST ACCOMPANY PATIENT FOR TRANSFER: copy of chart. Transfer MAR. 

COPIED CHART: unit secretary 

MOT INFO RECEIVED FROM: Cammy Newton

PHYSICIANS ORDER/RECONCILED MED LIST: to be obtained by bedside RN

OUT-OF-HOSPITAL DNR: n/a



MOT was given to Concha, unit secretary. 

VIOLETA Suero and Bernadette RN house supervisor was notified of MOT.

## 2020-02-18 NOTE — NUR
Spoke with Cammy Newton with [facility]. She said she cannot find clinicals on pt. 

Asked CM to refax to [phone].



Clinicals were faxed, confirmation received. 

Cammy also confirmed that she got clinicals and will give MOT once she is finished with the 
eval.

## 2020-02-18 NOTE — NUR
Spoke to Dr. Barney who states pt can transfer to [facility] for inpatient rehab today once 
we get a bed. 



Per Cammy Newton with [facility]. Should have bed today.

## 2020-02-18 NOTE — PROGRESS NOTE
DATE:  02/18/2020  

 

SUBJECTIVE:  Ms. Squires was seen on rounds today.  She got admitted to the regular

unit.  Overall, she is doing relatively well.  Feels better.  No focal deficits at this

time, just weak in general, having difficult time sitting up. 

 

OBJECTIVE:  VITAL SIGNS:  Temperature 97.3, respirations 18, heart rate 70, and blood

pressure 111/75. 

GENERAL:  The patient now ambulated about 85 feet with minimal assist.  Use of a rolling

walker.  Clinically, she is doing better. 

HEART:  Regular. 

LUNGS:  Diminished breath sounds. 

ABDOMEN:  Nondistended and nontender. 

NECK:  No JVD.

 

LABORATORY DATA:  White cell count of 4.7, hemoglobin 8.6, hematocrit 26.5, and

platelets of 227. 

 

IMPRESSION:  

1. Status post cerebrovascular accident.

2. History of breast cancer.

3. The patient with history of atrial fibrillation.

4. Hypertension.

 

PLAN:  Inpatient rehab for multidisciplinary inpatient rehab program to optimize

functional level in order to return home.  She is on board agreeable. 

 

 

 

 

______________________________

Jake Lindsay DO RPL/MODL

D:  02/18/2020 10:14:43

T:  02/18/2020 12:43:47

Job #:  [id number]

## 2020-02-18 NOTE — PROGRESS NOTE
DATE:  02/18/2020

 

Cardiology Progress Note 

 

SUBJECTIVE:  No new complaints.

 

OBJECTIVE:  VITAL SIGNS:  Temperature 97.7, heart rate 69, blood pressure 106/56,

respiratory rate 18, and O2 saturation 98%. 

GENERAL:  In no acute distress.  Alert. 

NECK:  No JVD. 

CHEST:  Clear to auscultation. 

CARDIOVASCULAR:  Regular rate and rhythm.  Systolic ejection murmur.  No S3 or S4. 

ABDOMEN:  Soft.  Bowel sounds positive. 

EXTREMITIES:  No edema.

 

CARDIOVASCULAR MEDICATIONS:  Reviewed.  Atorvastatin 20 mg at bedtime, amiodarone 400 mg

b.i.d., warfarin daily, and metoprolol tartrate 25 mg every 12 hours. 

 

STUDIES:  Reviewed.  Creatinine 0.6.  Hemoglobin 8.6 and platelets 227.

 

ASSESSMENT AND PLAN:  An 79-year-old woman presents with acute stroke in the setting of

paroxysmal atrial fibrillation, aortic valve disorder, and anemia. 

 

RECOMMEND:  

1. Anticoagulation being adjusted by Hem/Onc.

2. Continue rest of cardiovascular medications.  Encourage p.o. intake and rehab.

 

 

 

 

______________________________

MD JOSE J Tejeda/MODL

D:  02/18/2020 17:19:19

T:  02/18/2020 19:15:14

Job #:  [id number]

## 2020-02-18 NOTE — NUR
reported room is ready at [facility]. Paged Dr. Barney to see if 
patient will transfer today, awaiting call back

## 2020-06-29 LAB
ALBUMIN SERPL-MCNC: 3.9 G/DL (ref 3.5–5)
ALBUMIN/GLOB SERPL: 1.1 {RATIO} (ref 0.8–2)
ALP SERPL-CCNC: 90 IU/L (ref 40–150)
ALT SERPL-CCNC: 320 IU/L (ref 0–55)
ANION GAP SERPL CALC-SCNC: 14.1 MMOL/L (ref 8–16)
BASOPHILS # BLD AUTO: 0 10*3/UL (ref 0–0.1)
BASOPHILS NFR BLD AUTO: 0.5 % (ref 0–1)
BUN SERPL-MCNC: 17 MG/DL (ref 7–26)
BUN/CREAT SERPL: 18 (ref 6–25)
CALCIUM SERPL-MCNC: 10.4 MG/DL (ref 8.4–10.2)
CHLORIDE SERPL-SCNC: 94 MMOL/L (ref 98–107)
CO2 SERPL-SCNC: 32 MMOL/L (ref 22–29)
DEPRECATED APTT PLAS QN: 28.1 SECONDS (ref 23.8–35.5)
DEPRECATED INR PLAS: 0.95
DEPRECATED NEUTROPHILS # BLD AUTO: 3.5 10*3/UL (ref 2.1–6.9)
EGFRCR SERPLBLD CKD-EPI 2021: 59 ML/MIN (ref 60–?)
EOSINOPHIL # BLD AUTO: 0 10*3/UL (ref 0–0.4)
EOSINOPHIL NFR BLD AUTO: 0.5 % (ref 0–6)
ERYTHROCYTE [DISTWIDTH] IN CORD BLOOD: 14.9 % (ref 11.7–14.4)
GLOBULIN PLAS-MCNC: 3.7 G/DL (ref 2.3–3.5)
GLUCOSE SERPLBLD-MCNC: 168 MG/DL (ref 74–118)
HCT VFR BLD AUTO: 46.3 % (ref 34.2–44.1)
HGB BLD-MCNC: 15 G/DL (ref 12–16)
LYMPHOCYTES # BLD: 1.4 10*3/UL (ref 1–3.2)
LYMPHOCYTES NFR BLD AUTO: 23.6 % (ref 18–39.1)
MCH RBC QN AUTO: 29.2 PG (ref 28–32)
MCHC RBC AUTO-ENTMCNC: 32.4 G/DL (ref 31–35)
MCV RBC AUTO: 90.1 FL (ref 81–99)
MONOCYTES # BLD AUTO: 0.9 10*3/UL (ref 0.2–0.8)
MONOCYTES NFR BLD AUTO: 14.9 % (ref 4.4–11.3)
NEUTS SEG NFR BLD AUTO: 60.2 % (ref 38.7–80)
PLATELET # BLD AUTO: 209 X10E3/UL (ref 140–360)
POTASSIUM SERPL-SCNC: 3.1 MMOL/L (ref 3.5–5.1)
PROTHROMBIN TIME: 13.2 SECONDS (ref 11.9–14.5)
RBC # BLD AUTO: 5.14 X10E6/UL (ref 3.6–5.1)
SODIUM SERPL-SCNC: 137 MMOL/L (ref 136–145)

## 2020-07-08 ENCOUNTER — HOSPITAL ENCOUNTER (OUTPATIENT)
Dept: HOSPITAL 88 - LAB | Age: 80
End: 2020-07-08
Attending: INTERNAL MEDICINE
Payer: MEDICARE

## 2020-07-08 DIAGNOSIS — R30.0: Primary | ICD-10-CM

## 2020-07-08 PROCEDURE — 81001 URINALYSIS AUTO W/SCOPE: CPT

## 2020-07-08 PROCEDURE — 87086 URINE CULTURE/COLONY COUNT: CPT

## 2020-07-09 ENCOUNTER — HOSPITAL ENCOUNTER (OUTPATIENT)
Dept: HOSPITAL 88 - CATH LAB | Age: 80
Discharge: HOME | End: 2020-07-09
Attending: INTERNAL MEDICINE
Payer: MEDICARE

## 2020-07-09 VITALS — DIASTOLIC BLOOD PRESSURE: 66 MMHG | SYSTOLIC BLOOD PRESSURE: 120 MMHG

## 2020-07-09 VITALS — SYSTOLIC BLOOD PRESSURE: 130 MMHG | DIASTOLIC BLOOD PRESSURE: 77 MMHG

## 2020-07-09 VITALS — SYSTOLIC BLOOD PRESSURE: 114 MMHG | DIASTOLIC BLOOD PRESSURE: 77 MMHG

## 2020-07-09 VITALS — DIASTOLIC BLOOD PRESSURE: 77 MMHG | SYSTOLIC BLOOD PRESSURE: 133 MMHG

## 2020-07-09 VITALS — WEIGHT: 113 LBS | BODY MASS INDEX: 20.8 KG/M2 | HEIGHT: 62 IN

## 2020-07-09 VITALS — SYSTOLIC BLOOD PRESSURE: 113 MMHG | DIASTOLIC BLOOD PRESSURE: 71 MMHG

## 2020-07-09 VITALS — SYSTOLIC BLOOD PRESSURE: 131 MMHG | DIASTOLIC BLOOD PRESSURE: 71 MMHG

## 2020-07-09 DIAGNOSIS — Z79.01: ICD-10-CM

## 2020-07-09 DIAGNOSIS — Z82.49: ICD-10-CM

## 2020-07-09 DIAGNOSIS — Z11.59: ICD-10-CM

## 2020-07-09 DIAGNOSIS — Z01.810: ICD-10-CM

## 2020-07-09 DIAGNOSIS — K21.9: ICD-10-CM

## 2020-07-09 DIAGNOSIS — Z86.19: ICD-10-CM

## 2020-07-09 DIAGNOSIS — Z01.812: ICD-10-CM

## 2020-07-09 DIAGNOSIS — H40.9: ICD-10-CM

## 2020-07-09 DIAGNOSIS — Z85.3: ICD-10-CM

## 2020-07-09 DIAGNOSIS — I10: ICD-10-CM

## 2020-07-09 DIAGNOSIS — I48.0: ICD-10-CM

## 2020-07-09 DIAGNOSIS — Z88.6: ICD-10-CM

## 2020-07-09 DIAGNOSIS — Z88.8: ICD-10-CM

## 2020-07-09 DIAGNOSIS — R94.39: ICD-10-CM

## 2020-07-09 DIAGNOSIS — I25.119: Primary | ICD-10-CM

## 2020-07-09 LAB
BACTERIA URNS QL MICRO: (no result) /HPF
BILIRUB UR QL: (no result)
CLARITY UR: (no result)
COLOR UR: YELLOW
DEPRECATED RBC URNS MANUAL-ACNC: (no result) /HPF (ref 0–5)
EPI CELLS URNS QL MICRO: (no result) /LPF
KETONES UR QL STRIP.AUTO: (no result)
LEUKOCYTE ESTERASE UR QL STRIP.AUTO: (no result)
NITRITE UR QL STRIP.AUTO: POSITIVE
PROT UR QL STRIP.AUTO: (no result)
SP GR UR STRIP: 1.01 (ref 1.01–1.02)
TRI-PHOS CRY URNS QL MICRO: (no result)
UROBILINOGEN UR STRIP-MCNC: 4 MG/DL (ref 0.2–1)
WBC #/AREA URNS HPF: (no result) /HPF (ref 0–5)

## 2020-07-09 PROCEDURE — 93458 L HRT ARTERY/VENTRICLE ANGIO: CPT

## 2020-07-09 PROCEDURE — 85730 THROMBOPLASTIN TIME PARTIAL: CPT

## 2020-07-09 PROCEDURE — 80053 COMPREHEN METABOLIC PANEL: CPT

## 2020-07-09 PROCEDURE — 93005 ELECTROCARDIOGRAM TRACING: CPT

## 2020-07-09 PROCEDURE — 36415 COLL VENOUS BLD VENIPUNCTURE: CPT

## 2020-07-09 PROCEDURE — 85025 COMPLETE CBC W/AUTO DIFF WBC: CPT

## 2020-07-09 PROCEDURE — 99152 MOD SED SAME PHYS/QHP 5/>YRS: CPT

## 2020-07-09 PROCEDURE — 85610 PROTHROMBIN TIME: CPT

## 2020-07-09 NOTE — OPERATIVE REPORT
DATE OF PROCEDURE:  07/09/2020

 

SURGEON:  Jaylen Parnell MD

 

PROCEDURE INDICATIONS:  Angina and abnormal stress test.

 

PROCEDURE COMPLICATIONS:  None.

 

ESTIMATED BLOOD LOSS:  Less than 15 mL.

 

PROCEDURES PERFORMED:  

1. Left heart catheterization.

2. Selective coronary angiography.

3. TR band hemostasis.

4. Moderate sedation.

 

PROCEDURE SUMMARY:  After consent was obtained, the patient was prepped and draped in a

sterile fashion.  The right radial site was locally infiltrated with 2% lidocaine and

5-Malawian outer diameter slender sheath was advanced after local administration of

lidocaine and administration of 5 mcg of fentanyl and 1 mg of Versed for sedation.  A

TIG catheter was used for engagement of left main and right coronary artery as well as

to cross the aortic valve for hemodynamic measurements.  These were the following

findings: 

1. LV pressure was 142/0 with end-diastolic pressure of 11.

2. Aortic pressure was 140/73.

3. No left ventriculogram was performed.

4. Left main is large in caliber with moderate calcifications and 20% stenosis gives an

LAD and circumflex both large in caliber. 

5. The LAD has moderate calcifications proximally with diffuse 40% on focal 50%

proximally.  After septal  and diagonal, there is 40% mid LAD stenosis.  The

LAD then gives another diagonal and continues as a small caliber LAD, which has 60%

diffuse stenosis at this level.  Of note, this vessel is approximately 1 to 1.5 mm in

caliber at this segment. 

6. The circumflex has 30% proximal stenosis and gives a small caliber and short 1st

obtuse marginal and a small caliber 2nd obtuse marginal that has terminal 70% areas of

stenosis.  Branching points of terminal subbranches overall 1 mm in caliber. 

7. The right coronary artery is dominant, proximal with 40% stenosis, mid with 30%

stenosis and gives a terminal RPDA and RPLV.  The RPLV has 30% focal stenosis. 

 

CONCLUSION:  Moderate-to-severe small vessel terminal, small caliber CAD.

 

RECOMMEND:  Medical management.  Wean TR band.

 

 

 

 

______________________________

Jalyen Parnell MD

 

AFV/MODL

D:  07/09/2020 19:55:56

T:  07/09/2020 20:34:24

Job #:  [id number]

## 2020-07-09 NOTE — NUR
1400pm  CATH LAB RECOVERY  DISCHARGE NURSING 
NOTE--------------------------------------------------------



Pt meets DC criteria.Rt Radial assessed for s/s of complication and presence of hematoma. 
Skin warm, dry, no discolor, and pulses present. IV removed from left hand. Distal tip 
appears intact. VS WNL. Pt denies pain, sob, or need at this time. Family at bedside Review 
of discharge paperwork and follow up instructions. verbalized understanding. Pt to 
wheelchair and transported to front of hospital. Transferred to private vehicle under own 
strength w/o incident with DC paperwork in hand. - basilio/rn

## 2020-07-09 NOTE — NUR
1320pm RADIAL/PEDAL COMPRESSION REMOVAL NOTE:     Initial  Cuff  volume   14  cc



           1320pm  -4cc Removed  No hematoma/bleeding noted with normal  neurovascular 
function.



           1330pm  -5cc Removed  No hematoma/ bleeding noted with normal  neurovascular 
function.



            1345pm  -5cc Removed  No hematoma/bleeding noted with normal  neurovascular 
function.



           



Air removal completed. 

Stasis achieved sterile 2x2,Tegaderm,  Coban  dressing  No hematoma, bleeding noted with 
normal neurovascular function. Wrist splint in place. Pt instructed on POC.

Ds/Rn

## 2020-07-09 NOTE — NUR
1225pm RECEIVING NOTE CATH LAB RECOVERY 
DEPT...............................................................





Bedside report received from Kiet MCDANIEL. Identifierx2. Alert oriented and appropriate, PERRLA, 
respirations even and unlabored to room air. Pulses x4 extremities equal and strong. Pedal 
pulses PT/DP X4 and marked. Cap fill brisk < 3 sec. Rt radial no fix diagnostic f/o 4wks Dr Gomez office.



Skin warm and dry integrity appears D/I. IV 20g to left hand  presents healthy w/o s/s of 
infiltration or complaint. Abdomen soft and supple. pt offered toileting, denies need to 
urinate or defecate. No personal affects with patient.  Family XXXXX. Pt and family 
verbalizes understanding of POC. 



Currently w/o complaint of pain or need. Peyton at bedside.basilio/josh

## 2020-08-07 ENCOUNTER — HOSPITAL ENCOUNTER (OUTPATIENT)
Dept: HOSPITAL 88 - CT | Age: 80
End: 2020-08-07
Attending: SURGERY
Payer: MEDICARE

## 2020-08-07 DIAGNOSIS — R22.9: Primary | ICD-10-CM

## 2020-08-07 LAB
BUN SERPL-MCNC: 17 MG/DL (ref 7–26)
BUN/CREAT SERPL: 20 (ref 6–25)
EGFRCR SERPLBLD CKD-EPI 2021: > 60 ML/MIN (ref 60–?)

## 2020-08-07 PROCEDURE — 72220 X-RAY EXAM SACRUM TAILBONE: CPT

## 2020-08-07 PROCEDURE — 84520 ASSAY OF UREA NITROGEN: CPT

## 2020-08-07 PROCEDURE — 36415 COLL VENOUS BLD VENIPUNCTURE: CPT

## 2020-08-07 PROCEDURE — 82565 ASSAY OF CREATININE: CPT

## 2020-08-07 PROCEDURE — 74177 CT ABD & PELVIS W/CONTRAST: CPT

## 2020-08-07 NOTE — DIAGNOSTIC IMAGING REPORT
EXAMINATION:  SACRUM X-RAY, COCCYX    



INDICATION: Sacral mass



COMPARISON: None

     

FINDINGS:



AP and lateral radiographs of the sacrum and coccyx demonstrate no acute

fracture or dislocation. Likely old healed coccyx fracture. Pubic symphysis

degenerative changes. Mild diffuse osteopenia. 



IMPRESSION: 

Findings of old coccyx fracture. No acute osseous injury.



Signed by: Suzi Daniels MD on 8/7/2020 9:42 AM

## 2020-08-07 NOTE — DIAGNOSTIC IMAGING REPORT
EXAM: CT Abdomen and Pelvis WITH intravenous contrast  



INDICATION: Palpable sacral lump



COMPARISON: None.



TECHNIQUE: Abdomen and pelvis were scanned utilizing a multidetector helical

scanner from the lung base to the pubic symphysis after administration of IV

contrast. Coronal and sagittal reformations were obtained. Routine protocol was

performed. Scan was performed during portal venous phase.

     

IV CONTRAST: 100mL of Isovue 370

ORAL CONTRAST: Gastrografin



RADIATION DOSE:

Total DLP:  227 mGy*cm



Dose modulation, iterative reconstruction, and/or weight based adjustment of

the mA/kV was utilized to reduce the radiation dose to as low as reasonably

achievable. 



FINDINGS:

LOWER THORAX: Scattered coronary artery calcifications. No focal lung base

consolidation.



HEPATOBILIARY:  1.9 cm hypodense lesion in the region of the portal bifurcation

is incompletely evaluated on this single phase CT. No other focal liver

lesions. Status post cholecystectomy.



SPLEEN: No splenomegaly.



PANCREAS: No focal masses or ductal dilatation.



ADRENALS: No adrenal nodules.

KIDNEYS/URETERS: No hydronephrosis, stones, or solid mass lesions.

PELVIC ORGANS/BLADDER: Hysterectomy.



PERITONEUM / RETROPERITONEUM: No free air or fluid.

LYMPH NODES: No lymphadenopathy.

VESSELS: Scattered atherosclerotic calcifications of the nonaneurysmal

abdominal aorta and major branches.



GI TRACT: Diverticulosis without CT evidence of diverticulitis. Increased stool

burden in the colon. No abnormal bowel wall thickening. No bowel obstruction.



BONES AND SOFT TISSUES: Old coccyx fracture with mild post traumatic deformity.

No acute osseous injury. Soft tissue stranding of the overlying subcutaneous

soft tissues.



IMPRESSION: 

Old coccyx fracture with overlying superficial subcutaneous soft tissue

stranding.



Nonspecific 1.9cm hypodense liver lesion in the region of the portal

bifurcation is incompletely evaluated on this single phase CT. Further

evaluation can be made with MRI or ultrasound.



Diverticulosis without CT evidence of diverticulitis.





Signed by: Suzi Daniels MD on 8/7/2020 12:10 PM

## 2020-12-16 NOTE — NUR
Report given to night shift. Respiration even and unlabored without SOB. Call light in 
reach. Family at bedside. [id number]